# Patient Record
Sex: FEMALE | Race: WHITE | Employment: FULL TIME | URBAN - METROPOLITAN AREA
[De-identification: names, ages, dates, MRNs, and addresses within clinical notes are randomized per-mention and may not be internally consistent; named-entity substitution may affect disease eponyms.]

---

## 2017-12-07 ENCOUNTER — PRE VISIT (OUTPATIENT)
Dept: CARDIOLOGY | Facility: CLINIC | Age: 32
End: 2017-12-07

## 2017-12-07 DIAGNOSIS — R01.1 HEART MURMUR: Primary | ICD-10-CM

## 2017-12-11 ASSESSMENT — ENCOUNTER SYMPTOMS
BLOATING: 1
MUSCLE CRAMPS: 0
DISTURBANCES IN COORDINATION: 1
ARTHRALGIAS: 1
SINUS PAIN: 1
INCREASED ENERGY: 0
PANIC: 1
DYSPNEA ON EXERTION: 1
PALPITATIONS: 1
DISTURBANCES IN COORDINATION: 1
MEMORY LOSS: 0
LEG PAIN: 0
POSTURAL DYSPNEA: 1
LOSS OF CONSCIOUSNESS: 0
FATIGUE: 1
HYPOTENSION: 1
WEAKNESS: 1
NECK MASS: 0
LIGHT-HEADEDNESS: 1
PARALYSIS: 0
SPEECH CHANGE: 0
PANIC: 1
TASTE DISTURBANCE: 1
FEVER: 0
HEARTBURN: 1
BLOOD IN STOOL: 0
ABDOMINAL PAIN: 1
LEG PAIN: 0
INCREASED ENERGY: 0
HALLUCINATIONS: 0
CHILLS: 1
BLOOD IN STOOL: 0
LOSS OF CONSCIOUSNESS: 0
NUMBNESS: 0
SPUTUM PRODUCTION: 0
LIGHT-HEADEDNESS: 1
DIZZINESS: 1
BRUISES/BLEEDS EASILY: 0
SMELL DISTURBANCE: 1
VOMITING: 1
COUGH DISTURBING SLEEP: 0
JOINT SWELLING: 0
WHEEZING: 1
EXERCISE INTOLERANCE: 1
WEIGHT GAIN: 0
SHORTNESS OF BREATH: 1
NUMBNESS: 0
EXERCISE INTOLERANCE: 1
STIFFNESS: 1
INSOMNIA: 1
NECK PAIN: 1
HOARSE VOICE: 0
MUSCLE WEAKNESS: 1
WEIGHT LOSS: 0
ALTERED TEMPERATURE REGULATION: 1
DECREASED CONCENTRATION: 1
NERVOUS/ANXIOUS: 1
HOARSE VOICE: 0
COUGH: 1
SYNCOPE: 0
HYPERTENSION: 0
HEADACHES: 1
ALTERED TEMPERATURE REGULATION: 1
HYPERTENSION: 0
DIARRHEA: 0
MEMORY LOSS: 0
CHILLS: 1
INSOMNIA: 1
NECK MASS: 0
SPUTUM PRODUCTION: 0
BACK PAIN: 1
TROUBLE SWALLOWING: 0
TREMORS: 1
MUSCLE CRAMPS: 0
FATIGUE: 1
BLOATING: 1
POLYDIPSIA: 0
VOMITING: 1
SMELL DISTURBANCE: 1
JOINT SWELLING: 0
JAUNDICE: 0
NIGHT SWEATS: 0
MUSCLE WEAKNESS: 1
ABDOMINAL PAIN: 1
TREMORS: 1
NAUSEA: 1
MYALGIAS: 1
NIGHT SWEATS: 0
SLEEP DISTURBANCES DUE TO BREATHING: 1
PARALYSIS: 0
HALLUCINATIONS: 0
CONSTIPATION: 0
TROUBLE SWALLOWING: 0
SINUS CONGESTION: 1
SWOLLEN GLANDS: 0
DEPRESSION: 0
WEAKNESS: 1
ORTHOPNEA: 1
SNORES LOUDLY: 0
ARTHRALGIAS: 1
RECTAL PAIN: 0
STIFFNESS: 1
DIZZINESS: 1
COUGH: 1
SORE THROAT: 0
SEIZURES: 0
BOWEL INCONTINENCE: 0
PALPITATIONS: 1
DECREASED APPETITE: 1
MYALGIAS: 1
RECTAL PAIN: 0
SEIZURES: 0
WEIGHT LOSS: 0
NECK PAIN: 1
HEMOPTYSIS: 0
COUGH DISTURBING SLEEP: 0
TINGLING: 1
SINUS PAIN: 1
DECREASED APPETITE: 1
POLYPHAGIA: 0
HEADACHES: 1
SHORTNESS OF BREATH: 1
SLEEP DISTURBANCES DUE TO BREATHING: 1
HEARTBURN: 1
SWOLLEN GLANDS: 0
NERVOUS/ANXIOUS: 1
HYPOTENSION: 1
BOWEL INCONTINENCE: 0
SNORES LOUDLY: 0
SINUS CONGESTION: 1
TINGLING: 1
POLYDIPSIA: 0
WHEEZING: 1
FEVER: 0
TASTE DISTURBANCE: 1
SYNCOPE: 0
SORE THROAT: 0
CONSTIPATION: 0
DIARRHEA: 0
ORTHOPNEA: 1
POSTURAL DYSPNEA: 1
BRUISES/BLEEDS EASILY: 0
DECREASED CONCENTRATION: 1
DEPRESSION: 0
BACK PAIN: 1
SPEECH CHANGE: 0
HEMOPTYSIS: 0
JAUNDICE: 0
WEIGHT GAIN: 0
DYSPNEA ON EXERTION: 1
POLYPHAGIA: 0
NAUSEA: 1

## 2017-12-12 ENCOUNTER — OFFICE VISIT (OUTPATIENT)
Dept: CARDIOLOGY | Facility: CLINIC | Age: 32
End: 2017-12-12
Attending: INTERNAL MEDICINE
Payer: COMMERCIAL

## 2017-12-12 VITALS
WEIGHT: 120.6 LBS | HEART RATE: 101 BPM | HEIGHT: 63 IN | OXYGEN SATURATION: 98 % | BODY MASS INDEX: 21.37 KG/M2 | DIASTOLIC BLOOD PRESSURE: 68 MMHG | SYSTOLIC BLOOD PRESSURE: 105 MMHG

## 2017-12-12 DIAGNOSIS — R07.89 ATYPICAL CHEST PAIN: ICD-10-CM

## 2017-12-12 DIAGNOSIS — R00.2 PALPITATIONS: Primary | ICD-10-CM

## 2017-12-12 DIAGNOSIS — R01.1 HEART MURMUR: ICD-10-CM

## 2017-12-12 DIAGNOSIS — Z33.1 PREGNANT STATE, INCIDENTAL: ICD-10-CM

## 2017-12-12 DIAGNOSIS — R06.02 SOB (SHORTNESS OF BREATH): ICD-10-CM

## 2017-12-12 PROCEDURE — 99212 OFFICE O/P EST SF 10 MIN: CPT | Mod: 25,ZF

## 2017-12-12 PROCEDURE — 99204 OFFICE O/P NEW MOD 45 MIN: CPT | Mod: ZP | Performed by: INTERNAL MEDICINE

## 2017-12-12 PROCEDURE — 93010 ELECTROCARDIOGRAM REPORT: CPT | Mod: ZP | Performed by: INTERNAL MEDICINE

## 2017-12-12 PROCEDURE — 93005 ELECTROCARDIOGRAM TRACING: CPT | Mod: ZF

## 2017-12-12 RX ORDER — MULTIVITAMIN WITH IRON
TABLET ORAL
COMMUNITY
Start: 2017-12-07

## 2017-12-12 RX ORDER — ALBUTEROL SULFATE 90 UG/1
AEROSOL, METERED RESPIRATORY (INHALATION)
COMMUNITY
Start: 2017-06-05

## 2017-12-12 RX ORDER — LORAZEPAM 0.5 MG/1
TABLET ORAL PRN
COMMUNITY
Start: 2017-09-07

## 2017-12-12 RX ORDER — MAGNESIUM GLYCINATE 100 MG
TABLET ORAL
COMMUNITY

## 2017-12-12 RX ORDER — PROCHLORPERAZINE MALEATE 10 MG
TABLET ORAL
COMMUNITY
Start: 2017-12-07

## 2017-12-12 ASSESSMENT — PAIN SCALES - GENERAL: PAINLEVEL: NO PAIN (0)

## 2017-12-12 NOTE — NURSING NOTE
Chief Complaint   Patient presents with     Shortness of Breath     Murmur; EKG     Chest Pain     Vitals were taken and medications were reconciled. EKG was performed    Becky Rizzo MA  7:33 AM

## 2017-12-12 NOTE — LETTER
Date:December 13, 2017      Patient was self referred, no letter generated. Do not send.        [facility] Health Information

## 2017-12-12 NOTE — LETTER
12/12/2017      RE: Cayla Timmons  [address] [facility] [address]       Dear Colleague,    Thank you for the opportunity to participate in the care of your patient, Cayla Timmons, at the [facility]. Please see a copy of my visit note below.    I am delighted to see Cayla Timmons in consultation.The primary encounter diagnosis was Palpitations. Diagnoses of Heart murmur, SOB (shortness of breath), Atypical chest pain, and Pregnant state, incidental were also pertinent to this visit.   As you know, the patient is a 32 year old  female. She   has a past medical history of Anxiety; Endometriosis; Fibromyalgia; and Graves disease..    On this visit, the patient states that she has SOB, atypical chest pain, and palpitations since last January.  Her chest pain is constant and does not vary with exercise, breathing, or eating.  Her palpations happen several times a day without syncope. No family history of sudden death. Her SOB is at rest and worsened with exercise.  No fevers or coughing. The patient denies syncope, orthopnea, paroxysmal nocturnal dyspnea and lower extermity edema.    The patient's cardiovascular risk factors include asthma.    The following portions of the patient's history were reviewed and updated as appropriate: allergies, current medications, past family history, past medical history, past social history, past surgical history, and the problem list.    PMH: The patient's past medical history includes:    Past Medical History:   Diagnosis Date     Anxiety      Endometriosis      Fibromyalgia      Graves disease       Past Surgical History:   Procedure Laterality Date     CHOLECYSTECTOMY       CYSTOSCOPY, RETROGRADES, COMBINED  6/11/2013    Procedure: COMBINED CYSTOSCOPY, RETROGRADES;;  Surgeon: Gerson Salazar MD;  Location: [facility] OR     DAVINCI LYSIS OF ADHESIONS  6/11/2013    Procedure: DAVINCI LYSIS OF  ADHESIONS;  CYSTOSCOPY, RIGHT RETROGRADE IN ROOM 19 (DR MAYA) DAVINCI PELVISCOPY WITH EXCISION OF ENDOMETRIOSIS (Dr Zavala) ;  Surgeon: Efraín Zavala MD;  Location: [facility] OR     tonsellectomy       wisdom teeth extraction         The patient's medications as of the current encounter are:     Current Outpatient Prescriptions   Medication Sig Dispense Refill     mometasone-formoterol (DULERA) 100-5 MCG/ACT oral inhaler        Prenatal Vit-Fe Fumarate-FA (PRENATAL PO)        prochlorperazine (COMPAZINE) 10 MG tablet        pyridoxine (VITAMIN B-6) 100 MG tablet        sertraline (ZOLOFT) 50 MG tablet        DiphenhydrAMINE HCl, Sleep, (UNISOM SLEEPGELS) 50 MG CAPS        albuterol (VENTOLIN HFA) 108 (90 BASE) MCG/ACT Inhaler        Cholecalciferol (VITAMIN D-3) 5000 UNITS TABS        Magnesium Bisglycinate (MAG GLYCINATE) 100 MG TABS        Albuterol Sulfate (PROAIR RESPICLICK) 108 (90 BASE) MCG/ACT AEPB INL 2 PFS PO Q 4 H       LORazepam (ATIVAN) 0.5 MG tablet as needed       DOCOSAHEXAENOIC ACID PO as needed         Labs:     Admission on 06/11/2013, Discharged on 06/11/2013   Component Date Value Ref Range Status     HCG Qual Urine 06/11/2013 neg  neg Final     Internal QC OK 06/11/2013 Yes   Final       Allergies:    Allergies   Allergen Reactions     Dairy Digestive Difficulty breathing and Shortness Of Breath     Strawberry Hives     Contrast Dye      Severe nausea     Chicken-Derived Products (Egg)      Ragweeds Fatigue and Itching     Gluten Meal Anxiety, Fatigue and Muscle Pain (Myalgia)     Latex Rash       Family History:   Family History   Problem Relation Age of Onset     DIABETES Mother      Hypertension Father      Hyperlipidemia Father      Gout Father      Migraines Brother      Obsessive Compulsive Disorder Brother        Psychosocial history:  reports that she has never smoked. She does not have any smokeless tobacco history on file. She reports that she does not drink alcohol or  "use illicit drugs.    Review of systems: positive for palpitations, chest pain, dyspnea on exertion and exercise intolerance    In addition,   General: No change in weight, sleep. Nausea associated with pregnancy.  reduced energy.  No fever or chills  Eyes: Negative for vision changes or eye problems  ENT: No problems with ears, nose or throat.  No difficulty swallowing.  Resp: No coughing. Wheezing with exercise  GI: No heartburn, abdominal pain, diarrhea, constipation or change in bowel habits  : No urinary frequency or dysuria, bladder or kidney problems  Musculoskeletal: No significant muscle or joint pains. Generalized weakness  Neurologic: No headaches, numbness, tingling, weakness, problems with balance or coordination  Psychiatric: Anxiety  Heme/immune/allergy: No history of bleeding or clotting problems or anemia.    Endocrine:History of Graves. Recent TSH normal  Skin: No rashes,worrisome lesions or skin problems  Vascular:  No claudication, lifestyle limiting or otherwise; no ischemic rest pain; no non-healing ulcers. No weakness, No loss of sensation        Physical examination  Vitals: /68 (BP Location: Left arm, Cuff Size: Adult Regular)  Pulse 101  Ht 1.6 m (5' 3\")  Wt 54.7 kg (120 lb 9.6 oz)  SpO2 98%  BMI 21.36 kg/m2  BMI= Body mass index is 21.36 kg/(m^2).    In general, the patient is a pleasant female in no apparent distress.    HEENT: Normocephalic and atraumatic.  PERRLA.  EOMI.  Sclerae white, not injected.  No nystagmus. Nares clear.  Pharynx without erythema or exudate.  Dentition intact.    Neck: No adenopathy.  No thyromegaly. Carotids +2/2 bilaterally without bruits.  No jugular venous distension.   Heart:  The PMI is in the 5th ICS in the midclavicular line. There is no heave. Regular rate and rhythm. Normal S1, S2 splits physiologically. No murmur, rub, click, or gallop.    Lungs: Clear to asculation.  No ronchi, wheezes, rales.  No dullness to percussion.   Abdomen: Soft, " nontender. No organomegaly. No AAA.  No bruits. Uterus at pelvic symphysis  Extremities: No clubbing, cyanosis, or edema. The pulses were intact bilaterally.   Neurological: The neurological examination reveal a patient who was oriented to person, place, and time.  The remainder of the examination was nonfocal.  5/5 strength upper=lower, left=right, prox=distal    Cardiac tests include:    ECG: normal sinus with normal intervals axis and waveforms    Assessment and Plan    1. SOB - O2 sat normal, Hb normal  - will rule out lung disease with PFTs  - check echo  2. Chest pain - recent ED visit with normal troponin  - plan for exercise echo stress test.  3. Palpitations - plan for event recorder  - risk stratification with echo  4. Pregnancy - expectant management    The patient is to return  4 weeks. The patient understood the treatment plan as outlined above.  There were no barriers to learning.      Magdaleno Winston MD       Please do not hesitate to contact me if you have any questions/concerns.     Sincerely,     Magdaleno Winston MD

## 2017-12-12 NOTE — PROGRESS NOTES
I am delighted to see Cayla Timmons in consultation.The primary encounter diagnosis was Palpitations. Diagnoses of Heart murmur, SOB (shortness of breath), Atypical chest pain, and Pregnant state, incidental were also pertinent to this visit.   As you know, the patient is a 32 year old  female. She   has a past medical history of Anxiety; Endometriosis; Fibromyalgia; and Graves disease..    On this visit, the patient states that she has SOB, atypical chest pain, and palpitations since last January.  Her chest pain is constant and does not vary with exercise, breathing, or eating.  Her palpations happen several times a day without syncope. No family history of sudden death. Her SOB is at rest and worsened with exercise.  No fevers or coughing. The patient denies syncope, orthopnea, paroxysmal nocturnal dyspnea and lower extermity edema.    The patient's cardiovascular risk factors include asthma.    The following portions of the patient's history were reviewed and updated as appropriate: allergies, current medications, past family history, past medical history, past social history, past surgical history, and the problem list.    PMH: The patient's past medical history includes:    Past Medical History:   Diagnosis Date     Anxiety      Endometriosis      Fibromyalgia      Graves disease       Past Surgical History:   Procedure Laterality Date     CHOLECYSTECTOMY       CYSTOSCOPY, RETROGRADES, COMBINED  6/11/2013    Procedure: COMBINED CYSTOSCOPY, RETROGRADES;;  Surgeon: Gerson Maya MD;  Location:  OR     DAVINCI LYSIS OF ADHESIONS  6/11/2013    Procedure: DAVINCI LYSIS OF ADHESIONS;  CYSTOSCOPY, RIGHT RETROGRADE IN ROOM 19 (DR MAYA) DAVINCI PELVISCOPY WITH EXCISION OF ENDOMETRIOSIS (Dr Zavala) ;  Surgeon: Efraín Zavala MD;  Location:  OR     tonsellectomy       wisdom teeth extraction         The patient's medications as of the current encounter are:     Current  Outpatient Prescriptions   Medication Sig Dispense Refill     mometasone-formoterol (DULERA) 100-5 MCG/ACT oral inhaler        Prenatal Vit-Fe Fumarate-FA (PRENATAL PO)        prochlorperazine (COMPAZINE) 10 MG tablet        pyridoxine (VITAMIN B-6) 100 MG tablet        sertraline (ZOLOFT) 50 MG tablet        DiphenhydrAMINE HCl, Sleep, (UNISOM SLEEPGELS) 50 MG CAPS        albuterol (VENTOLIN HFA) 108 (90 BASE) MCG/ACT Inhaler        Cholecalciferol (VITAMIN D-3) 5000 UNITS TABS        Magnesium Bisglycinate (MAG GLYCINATE) 100 MG TABS        Albuterol Sulfate (PROAIR RESPICLICK) 108 (90 BASE) MCG/ACT AEPB INL 2 PFS PO Q 4 H       LORazepam (ATIVAN) 0.5 MG tablet as needed       DOCOSAHEXAENOIC ACID PO as needed         Labs:     Admission on 06/11/2013, Discharged on 06/11/2013   Component Date Value Ref Range Status     HCG Qual Urine 06/11/2013 neg  neg Final     Internal QC OK 06/11/2013 Yes   Final       Allergies:    Allergies   Allergen Reactions     Dairy Digestive Difficulty breathing and Shortness Of Breath     Strawberry Hives     Contrast Dye      Severe nausea     Chicken-Derived Products (Egg)      Ragweeds Fatigue and Itching     Gluten Meal Anxiety, Fatigue and Muscle Pain (Myalgia)     Latex Rash       Family History:   Family History   Problem Relation Age of Onset     DIABETES Mother      Hypertension Father      Hyperlipidemia Father      Gout Father      Migraines Brother      Obsessive Compulsive Disorder Brother        Psychosocial history:  reports that she has never smoked. She does not have any smokeless tobacco history on file. She reports that she does not drink alcohol or use illicit drugs.    Review of systems: positive for palpitations, chest pain, dyspnea on exertion and exercise intolerance    In addition,   General: No change in weight, sleep. Nausea associated with pregnancy.  reduced energy.  No fever or chills  Eyes: Negative for vision changes or eye problems  ENT: No problems  "with ears, nose or throat.  No difficulty swallowing.  Resp: No coughing. Wheezing with exercise  GI: No heartburn, abdominal pain, diarrhea, constipation or change in bowel habits  : No urinary frequency or dysuria, bladder or kidney problems  Musculoskeletal: No significant muscle or joint pains. Generalized weakness  Neurologic: No headaches, numbness, tingling, weakness, problems with balance or coordination  Psychiatric: Anxiety  Heme/immune/allergy: No history of bleeding or clotting problems or anemia.    Endocrine:History of Graves. Recent TSH normal  Skin: No rashes,worrisome lesions or skin problems  Vascular:  No claudication, lifestyle limiting or otherwise; no ischemic rest pain; no non-healing ulcers. No weakness, No loss of sensation        Physical examination  Vitals: /68 (BP Location: Left arm, Cuff Size: Adult Regular)  Pulse 101  Ht 1.6 m (5' 3\")  Wt 54.7 kg (120 lb 9.6 oz)  SpO2 98%  BMI 21.36 kg/m2  BMI= Body mass index is 21.36 kg/(m^2).    In general, the patient is a pleasant female in no apparent distress.    HEENT: Normocephalic and atraumatic.  PERRLA.  EOMI.  Sclerae white, not injected.  No nystagmus. Nares clear.  Pharynx without erythema or exudate.  Dentition intact.    Neck: No adenopathy.  No thyromegaly. Carotids +2/2 bilaterally without bruits.  No jugular venous distension.   Heart:  The PMI is in the 5th ICS in the midclavicular line. There is no heave. Regular rate and rhythm. Normal S1, S2 splits physiologically. No murmur, rub, click, or gallop.    Lungs: Clear to asculation.  No ronchi, wheezes, rales.  No dullness to percussion.   Abdomen: Soft, nontender. No organomegaly. No AAA.  No bruits. Uterus at pelvic symphysis  Extremities: No clubbing, cyanosis, or edema. The pulses were intact bilaterally.   Neurological: The neurological examination reveal a patient who was oriented to person, place, and time.  The remainder of the examination was nonfocal.  5/5 " strength upper=lower, left=right, prox=distal    Cardiac tests include:    ECG: normal sinus with normal intervals axis and waveforms    Assessment and Plan    1. SOB - O2 sat normal, Hb normal  - will rule out lung disease with PFTs  - check echo  2. Chest pain - recent ED visit with normal troponin  - plan for exercise echo stress test.  3. Palpitations - plan for event recorder  - risk stratification with echo  4. Pregnancy - expectant management    The patient is to return  4 weeks. The patient understood the treatment plan as outlined above.  There were no barriers to learning.      Magdaleno Winston MD

## 2017-12-13 LAB — INTERPRETATION ECG - MUSE: NORMAL

## 2017-12-16 ENCOUNTER — HEALTH MAINTENANCE LETTER (OUTPATIENT)
Age: 32
End: 2017-12-16

## 2017-12-18 ENCOUNTER — CARE COORDINATION (OUTPATIENT)
Dept: CARDIOLOGY | Facility: CLINIC | Age: 32
End: 2017-12-18

## 2017-12-18 ENCOUNTER — RADIANT APPOINTMENT (OUTPATIENT)
Dept: CARDIOLOGY | Facility: CLINIC | Age: 32
End: 2017-12-18
Payer: COMMERCIAL

## 2017-12-18 ENCOUNTER — APPOINTMENT (OUTPATIENT)
Dept: LAB | Facility: CLINIC | Age: 32
End: 2017-12-18
Payer: COMMERCIAL

## 2017-12-18 ENCOUNTER — ALLIED HEALTH/NURSE VISIT (OUTPATIENT)
Dept: CARDIOLOGY | Facility: CLINIC | Age: 32
End: 2017-12-18
Attending: INTERNAL MEDICINE
Payer: COMMERCIAL

## 2017-12-18 ENCOUNTER — ANCILLARY ORDERS (OUTPATIENT)
Dept: CARDIOLOGY | Facility: CLINIC | Age: 32
End: 2017-12-18
Payer: COMMERCIAL

## 2017-12-18 DIAGNOSIS — R06.02 SOB (SHORTNESS OF BREATH): ICD-10-CM

## 2017-12-18 DIAGNOSIS — R07.89 ATYPICAL CHEST PAIN: ICD-10-CM

## 2017-12-18 DIAGNOSIS — R00.2 PALPITATIONS: ICD-10-CM

## 2017-12-18 DIAGNOSIS — R00.2 PALPITATIONS: Primary | ICD-10-CM

## 2017-12-18 LAB
DLCOUNC-%PRED-PRE: 101 %
DLCOUNC-PRE: 24.86 ML/MIN/MMHG
DLCOUNC-PRED: 24.4 ML/MIN/MMHG
ERV-%PRED-PRE: 64 %
ERV-PRE: 0.85 L
ERV-PRED: 1.33 L
EXPTIME-PRE: 3.42 SEC
FEF2575-%PRED-POST: 80 %
FEF2575-%PRED-PRE: 65 %
FEF2575-POST: 2.77 L/SEC
FEF2575-PRE: 2.28 L/SEC
FEF2575-PRED: 3.45 L/SEC
FEFMAX-%PRED-PRE: 70 %
FEFMAX-PRE: 4.83 L/SEC
FEFMAX-PRED: 6.85 L/SEC
FEV1-%PRED-PRE: 75 %
FEV1-PRE: 2.35 L
FEV1FEV6-PRE: 84 %
FEV1FEV6-PRED: 85 %
FEV1FVC-PRE: 84 %
FEV1FVC-PRED: 85 %
FEV1SVC-PRE: 85 %
FEV1SVC-PRED: 82 %
FIFMAX-PRE: 3.01 L/SEC
FRCPLETH-%PRED-PRE: 96 %
FRCPLETH-PRE: 2.54 L
FRCPLETH-PRED: 2.62 L
FVC-%PRED-PRE: 76 %
FVC-PRE: 2.79 L
FVC-PRED: 3.66 L
IC-%PRED-PRE: 78 %
IC-PRE: 1.9 L
IC-PRED: 2.42 L
RVPLETH-%PRED-PRE: 120 %
RVPLETH-PRE: 1.68 L
RVPLETH-PRED: 1.4 L
TLCPLETH-%PRED-PRE: 93 %
TLCPLETH-PRE: 4.44 L
TLCPLETH-PRED: 4.77 L
VA-%PRED-PRE: 90 %
VA-PRE: 4.44 L
VC-%PRED-PRE: 73 %
VC-PRE: 2.76 L
VC-PRED: 3.75 L

## 2017-12-18 PROCEDURE — 0298T ZZC EXT ECG > 48HR TO 21 DAY REVIEW AND INTERPRETATN: CPT | Performed by: INTERNAL MEDICINE

## 2017-12-18 PROCEDURE — 0296T ZIO PATCH HOLTER: CPT | Mod: ZF

## 2017-12-18 NOTE — NURSING NOTE
Per Magdaleno Espitia, patient to have 7 days Zio monitor placed.  Diagnosis: Palpitation  Monitor placed: Yes  Patient Instructed: Yes  Patient verbalized understanding: Yes  Holter # [id number]  Placed by Angela Law CMA. 10:10 AM

## 2017-12-21 ENCOUNTER — ONCOLOGY VISIT (OUTPATIENT)
Dept: ONCOLOGY | Facility: CLINIC | Age: 32
End: 2017-12-21
Attending: INTERNAL MEDICINE
Payer: COMMERCIAL

## 2017-12-21 VITALS
SYSTOLIC BLOOD PRESSURE: 97 MMHG | BODY MASS INDEX: 21.2 KG/M2 | DIASTOLIC BLOOD PRESSURE: 56 MMHG | HEART RATE: 72 BPM | TEMPERATURE: 98.7 F | RESPIRATION RATE: 16 BRPM | WEIGHT: 119.7 LBS | OXYGEN SATURATION: 98 %

## 2017-12-21 DIAGNOSIS — R06.02 SOB (SHORTNESS OF BREATH): Primary | ICD-10-CM

## 2017-12-21 DIAGNOSIS — Z3A.08 8 WEEKS GESTATION OF PREGNANCY: ICD-10-CM

## 2017-12-21 DIAGNOSIS — R00.2 PALPITATIONS: ICD-10-CM

## 2017-12-21 PROCEDURE — 99212 OFFICE O/P EST SF 10 MIN: CPT | Mod: ZF

## 2017-12-21 PROCEDURE — 99204 OFFICE O/P NEW MOD 45 MIN: CPT | Mod: ZP | Performed by: INTERNAL MEDICINE

## 2017-12-21 ASSESSMENT — PAIN SCALES - GENERAL: PAINLEVEL: NO PAIN (0)

## 2017-12-21 NOTE — NURSING NOTE
"Oncology Rooming Note    December 21, 2017 4:09 PM   Cayla Timmons is a 32 year old female who presents for:    Chief Complaint   Patient presents with     Oncology Clinic Visit     Anemia      Initial Vitals: BP 97/56  Pulse 72  Temp 98.7  F (37.1  C) (Tympanic)  Resp 16  Wt 54.3 kg (119 lb 11.2 oz)  SpO2 98%  BMI 21.2 kg/m2 Estimated body mass index is 21.2 kg/(m^2) as calculated from the following:    Height as of 12/12/17: 1.6 m (5' 3\").    Weight as of this encounter: 54.3 kg (119 lb 11.2 oz). Body surface area is 1.55 meters squared.  No Pain (0) Comment: Data Unavailable   No LMP recorded.  Allergies reviewed: Yes  Medications reviewed: Yes    Medications: Medication refills not needed today.  Pharmacy name entered into [employer]: [facility] [address] [place] [address] [facility] AT [facility]    Clinical concerns: No concerns  provider was NOT notified.    7 minutes for nursing intake (face to face time)     Hollis [place]              "

## 2017-12-21 NOTE — MR AVS SNAPSHOT
After Visit Summary   12/21/2017    Cayla Timmons    MRN: [id number]           Patient Information     Date Of Birth          1985        Visit Information        Provider Department      12/21/2017 4:00 PM Sonido Pemberton MD [facility]        Today's Diagnoses     SOB (shortness of breath)    -  1    Palpitations        8 weeks gestation of pregnancy           Follow-ups after your visit        Your next 10 appointments already scheduled     Jan 23, 2018  7:00 AM CST   (Arrive by 6:45 AM)   Return Visit with Magdaleno Winston MD   [facility] ([facility])    [address]  [address] [facility] [address] [phone]              Who to contact     If you have questions or need follow up information about today's clinic visit or your schedule please contact [facility] directly at [phone].  Normal or non-critical lab and imaging results will be communicated to you by MyChart, letter or phone within 4 business days after the clinic has received the results. If you do not hear from us within 7 days, please contact the clinic through [employer] or phone. If you have a critical or abnormal lab result, we will notify you by phone as soon as possible.  Submit refill requests through [employer] or call your pharmacy and they will forward the refill request to us. Please allow 3 business days for your refill to be completed.          Additional Information About Your Visit        MyChart Information     [employer] gives you secure access to your electronic health record. If you see a primary care provider, you can also send messages to your care team and make appointments. If you have questions, please call your primary care clinic.  If you do not have a primary care provider, please call [phone] and they will assist you.        Care EveryWhere ID     This is your Care EveryWhere ID. This could be used by  other organizations to access your [facility] medical records  [id number]        Your Vitals Were     Pulse Temperature Respirations Pulse Oximetry BMI (Body Mass Index)       72 98.7  F (37.1  C) (Tympanic) 16 98% 21.2 kg/m2        Blood Pressure from Last 3 Encounters:   12/21/17 97/56   12/12/17 105/68   06/11/13 98/68    Weight from Last 3 Encounters:   12/21/17 54.3 kg (119 lb 11.2 oz)   12/12/17 54.7 kg (120 lb 9.6 oz)   06/11/13 63.7 kg (140 lb 8 oz)              Today, you had the following     No orders found for display       Primary Care Provider Fax #    Physician No Ref-Primary [phone]       No address on file        Equal Access to Services     DAVID TORRES : Lyudmila Cleis, wanila joshiqadaha, qajanina kaalmadania simon, maria guadalupe simon . So [facility] [phone].    ATENCIÓN: Si habla español, tiene a jha disposición servicios gratuitos de asistencia lingüística. Llame al [phone].    We comply with applicable federal civil rights laws and [place] laws. We do not discriminate on the basis of race, color, national origin, age, disability, sex, sexual orientation, or gender identity.            Thank you!     Thank you for choosing [facility]  for your care. Our goal is always to provide you with excellent care. Hearing back from our patients is one way we can continue to improve our services. Please take a few minutes to complete the written survey that you may receive in the mail after your visit with us. Thank you!             Your Updated Medication List - Protect others around you: Learn how to safely use, store and throw away your medicines at www.disposemymeds.org.          This list is accurate as of: 12/21/17 11:59 PM.  Always use your most recent med list.                   Brand Name Dispense Instructions for use Diagnosis    DOCOSAHEXAENOIC ACID PO      as needed        DULERA 100-5 MCG/ACT oral inhaler   Generic drug:   mometasone-formoterol           LORazepam 0.5 MG tablet    ATIVAN     as needed        Mag Glycinate 100 MG Tabs           PRENATAL PO           prochlorperazine 10 MG tablet    COMPAZINE          pyridoxine 100 MG tablet    VITAMIN B-6          sertraline 50 MG tablet    ZOLOFT          UNISOM SLEEPGELS 50 MG Caps   Generic drug:  DiphenhydrAMINE HCl (Sleep)           * VENTOLIN  (90 BASE) MCG/ACT Inhaler   Generic drug:  albuterol           * PROAIR RESPICLICK 108 (90 BASE) MCG/ACT Aepb   Generic drug:  Albuterol Sulfate      INL 2 PFS PO Q 4 H        Vitamin D-3 5000 UNITS Tabs           * Notice:  This list has 2 medication(s) that are the same as other medications prescribed for you. Read the directions carefully, and ask your doctor or other care provider to review them with you.

## 2017-12-21 NOTE — LETTER
12/21/2017       RE: Cayla Timmons  [address]  [facility] [address]     Dear Colleague,    Thank you for referring your patient, Cayla Timmons, to the [facility]. Please see a copy of my visit note below.      HEMATOLOGY CLINIC VISIT    Cayla Timmons is a 32-year-old female self-referred for evaluation of anemia.  She is currently 8 weeks pregnant.  This is her first pregnancy.  She has a several-month history of dizziness, shortness of breath and palpitations.  These symptoms have been evaluated by other providers, including a recent evaluation in our system by Cardiology.  A specific cause for them has not been identified.  She reports a past history of anemia, and brings with her today a variety of lab results obtained over the last year or two.  She has many questions about some of the values on the CBCs obtained over that timeframe.  Her main concern is whether or not her symptoms are related to anemia.  She receives her primary care and OB care outside of our system.     PAST MEDICAL HISTORY:  Includes diagnoses of anxiety, endometriosis, fibromyalgia and Graves' disease.        PAST SURGICAL HISTORY:  Includes a cholecystectomy and laparoscopic lysis of adhesions for endometriosis.  She also has a history of tonsillectomy and wisdom teeth extraction.       CURRENT MEDICATIONS:  Were reviewed in the EMR.  She is taking a prenatal vitamin with iron.       PHYSICAL EXAMINATION:  She appears to be in good overall health.  She is not pale or jaundiced.  There is no scleral icterus.  A more detailed exam was not performed today.      LABORATORY DATA:  We obtained no new labs as part of today's visit.  We did review the labs that she brought with her in detail.  In summary, she has 6 CBCs that have been obtained during the period of 08/2016 through 12/2017, with the most recent CBC being obtained just 1 week ago.  In general, all the CBC parameters are normal.  Her white  count has been consistently in the normal range.  Her hemoglobin has fluctuated between 12.1 and 13.4.  Her platelet count has been consistently normal.  She did have a single CBC from 11/2017, which had an unexpectedly elevated MCV of 109.6.  This is the one CBC obtained in an outside lab.  The rest of the CBC parameters on that specimen appear to be within her baseline.  A CBC obtained 3 weeks following that, as well as 2 subsequent CBCs all showed consistently normal MCVs.  Thus, I suspect this single isolated elevated MCV reading is erroneous.  She was concerned about why her RBC count seemed to be trending down, and why her hemoglobin seemed to be trending down.  She also had questions about why her RDW was occasionally outside the normal range.  Please see further discussion below.      Additional labs obtained in early 12/2017 at an outside facility showed a normal iron panel with a ferritin of 115 and vitamin B12 level normal at 871.        ASSESSMENT AND PLAN:    Concern about anemia.      I spent a total of 45 minutes today with Cayla, all of which was in counseling and coordination of care.  She has had a several-month history of symptoms including dizziness, shortness of breath, and palpitations that she was concerned may be related to anemia.  She reports a past history of anemia, although we do not have any documentation of that.  She is currently 8 weeks pregnant.      We reviewed her CBCs as outlined above.  There are no significant abnormalities.  There are some mild fluctuations in some of the CBC parameters, and there are occasional values, which fall just outside the normal ranges.  I reassured her that these are normal fluctuations in these parameters, and that there are no concerning numbers or trends and nothing that suggests any underlying hematologic problem.  She is not anemic.      I did explain to her that her hemoglobin will be expected to go down during pregnancy.  I reassured her that  her OB/GYN physician will be monitoring that as part of the routine pregnancy care.  At this time, I do not see any indication of an underlying hematologic disorder as a cause for her symptoms.  I encouraged her to continue following up with her primary physician in that regard.  A return appointment here is not necessary.        At the end of our discussion she seemed reassured, and all of her questions have been answered to her apparent satisfaction.       Sonido Pemberton MD  Associate Professor of Medicine  Division of Hematology, Oncology, and Transplantation  Director, [facility]              Again, thank you for allowing me to participate in the care of your patient.      Sincerely,    Sonido Pemberton MD

## 2017-12-21 NOTE — LETTER
Date:January 2, 2018      Patient was self referred, no letter generated. Do not send.        [facility] Information

## 2017-12-22 NOTE — PROGRESS NOTES
HEMATOLOGY CLINIC VISIT    Cayla Timmons is a 32-year-old female self-referred for evaluation of anemia.  She is currently 8 weeks pregnant.  This is her first pregnancy.  She has a several-month history of dizziness, shortness of breath and palpitations.  These symptoms have been evaluated by other providers, including a recent evaluation in our system by Cardiology.  A specific cause for them has not been identified.  She reports a past history of anemia, and brings with her today a variety of lab results obtained over the last year or two.  She has many questions about some of the values on the CBCs obtained over that timeframe.  Her main concern is whether or not her symptoms are related to anemia.  She receives her primary care and OB care outside of our system.     PAST MEDICAL HISTORY:  Includes diagnoses of anxiety, endometriosis, fibromyalgia and Graves' disease.        PAST SURGICAL HISTORY:  Includes a cholecystectomy and laparoscopic lysis of adhesions for endometriosis.  She also has a history of tonsillectomy and wisdom teeth extraction.       CURRENT MEDICATIONS:  Were reviewed in the EMR.  She is taking a prenatal vitamin with iron.       PHYSICAL EXAMINATION:  She appears to be in good overall health.  She is not pale or jaundiced.  There is no scleral icterus.  A more detailed exam was not performed today.      LABORATORY DATA:  We obtained no new labs as part of today's visit.  We did review the labs that she brought with her in detail.  In summary, she has 6 CBCs that have been obtained during the period of 08/2016 through 12/2017, with the most recent CBC being obtained just 1 week ago.  In general, all the CBC parameters are normal.  Her white count has been consistently in the normal range.  Her hemoglobin has fluctuated between 12.1 and 13.4.  Her platelet count has been consistently normal.  She did have a single CBC from 11/2017, which had an unexpectedly elevated MCV of 109.6.  This is  the one CBC obtained in an outside lab.  The rest of the CBC parameters on that specimen appear to be within her baseline.  A CBC obtained 3 weeks following that, as well as 2 subsequent CBCs all showed consistently normal MCVs.  Thus, I suspect this single isolated elevated MCV reading is erroneous.  She was concerned about why her RBC count seemed to be trending down, and why her hemoglobin seemed to be trending down.  She also had questions about why her RDW was occasionally outside the normal range.  Please see further discussion below.      Additional labs obtained in early 12/2017 at an outside facility showed a normal iron panel with a ferritin of 115 and vitamin B12 level normal at 871.        ASSESSMENT AND PLAN:    Concern about anemia.      I spent a total of 45 minutes today with Cayla, all of which was in counseling and coordination of care.  She has had a several-month history of symptoms including dizziness, shortness of breath, and palpitations that she was concerned may be related to anemia.  She reports a past history of anemia, although we do not have any documentation of that.  She is currently 8 weeks pregnant.      We reviewed her CBCs as outlined above.  There are no significant abnormalities.  There are some mild fluctuations in some of the CBC parameters, and there are occasional values, which fall just outside the normal ranges.  I reassured her that these are normal fluctuations in these parameters, and that there are no concerning numbers or trends and nothing that suggests any underlying hematologic problem.  She is not anemic.      I did explain to her that her hemoglobin will be expected to go down during pregnancy.  I reassured her that her OB/GYN physician will be monitoring that as part of the routine pregnancy care.  At this time, I do not see any indication of an underlying hematologic disorder as a cause for her symptoms.  I encouraged her to continue following up with her primary  physician in that regard.  A return appointment here is not necessary.        At the end of our discussion she seemed reassured, and all of her questions have been answered to her apparent satisfaction.       Sonido Pemberton MD  Associate Professor of Medicine  Division of Hematology, Oncology, and Transplantation  Director, [facility]

## 2018-01-04 ENCOUNTER — TRANSFERRED RECORDS (OUTPATIENT)
Dept: HEALTH INFORMATION MANAGEMENT | Facility: CLINIC | Age: 33
End: 2018-01-04

## 2018-01-16 DIAGNOSIS — R06.02 SOB (SHORTNESS OF BREATH): ICD-10-CM

## 2018-01-16 ASSESSMENT — ENCOUNTER SYMPTOMS
BLOATING: 1
WHEEZING: 0
NAUSEA: 1
CONSTIPATION: 0
SLEEP DISTURBANCES DUE TO BREATHING: 1
HEARTBURN: 1
VOMITING: 0
HEMOPTYSIS: 0
BOWEL INCONTINENCE: 0
ABDOMINAL PAIN: 1
LIGHT-HEADEDNESS: 0
HYPERTENSION: 0
SHORTNESS OF BREATH: 1
JAUNDICE: 0
EXERCISE INTOLERANCE: 0
BLOOD IN STOOL: 0
HYPOTENSION: 0
DYSPNEA ON EXERTION: 0
DIARRHEA: 0
LEG PAIN: 0
POSTURAL DYSPNEA: 0
ORTHOPNEA: 0
COUGH: 0
SNORES LOUDLY: 0
SYNCOPE: 0
PALPITATIONS: 1
RECTAL PAIN: 0
SPUTUM PRODUCTION: 0
COUGH DISTURBING SLEEP: 0

## 2018-01-19 ENCOUNTER — PRE VISIT (OUTPATIENT)
Dept: CARDIOLOGY | Facility: CLINIC | Age: 33
End: 2018-01-19

## 2018-01-19 NOTE — TELEPHONE ENCOUNTER
Echo- 12/18/17  Interpretation Summary  Global and regional left ventricular function is normal with an EF of 60-65%.  Right ventricular function, chamber size, wall motion, and thickness are  normal.  Both atria appear normal.  No pericardial effusion is present.    [place]- 12/18/17        Exercise Stress Echo- 12/18/17  Interpretation Summary  Conclusions: Exercise stress echocardiogram test inconclusive for ischemia due  to inability to achieve target heart rate (78% MPHR).     Patient experienced no chest pain during exercise.  Test terminated due to fatigue.  Exercise capacity is average for age.  Normal HR response to exercise.  Blunted BP response to exercise.  EKG at baseline and with exercise demonstrated no ischemic changes.  Normal LV function at rest. LVEF 55%. No regional wall motion abnormalities.  At peak stress, adequate LV augmentation. LVEF 65%. No regional wall motion  abnormalities.  Baseline 2D screening echocardiogram reported separately. See additional  report.     Consider additional imaging modality if high suspicion for coronary artery  disease. Clinical correlation required.    PFT- 12/18/17    The FEV1 and FVC are reduced but the FEV1/FVC ratio is normal.  The inspiratory flow rates are reduced.  Lung volumes are within normal limits.  Following administration of bronchodilators, there is no significant response.  The diffusing capacity is   normal.  However, the diffusing capacity was not corrected for the patient's hemoglobin.

## 2018-01-23 ENCOUNTER — OFFICE VISIT (OUTPATIENT)
Dept: CARDIOLOGY | Facility: CLINIC | Age: 33
End: 2018-01-23
Attending: INTERNAL MEDICINE
Payer: COMMERCIAL

## 2018-01-23 VITALS
OXYGEN SATURATION: 97 % | HEIGHT: 63 IN | HEART RATE: 88 BPM | DIASTOLIC BLOOD PRESSURE: 68 MMHG | WEIGHT: 123 LBS | SYSTOLIC BLOOD PRESSURE: 95 MMHG | BODY MASS INDEX: 21.79 KG/M2

## 2018-01-23 DIAGNOSIS — R00.2 PALPITATIONS: ICD-10-CM

## 2018-01-23 DIAGNOSIS — R07.89 ATYPICAL CHEST PAIN: Primary | ICD-10-CM

## 2018-01-23 DIAGNOSIS — R06.02 SOB (SHORTNESS OF BREATH): ICD-10-CM

## 2018-01-23 PROCEDURE — 99213 OFFICE O/P EST LOW 20 MIN: CPT | Mod: GC | Performed by: INTERNAL MEDICINE

## 2018-01-23 PROCEDURE — G0463 HOSPITAL OUTPT CLINIC VISIT: HCPCS | Mod: ZF

## 2018-01-23 ASSESSMENT — PAIN SCALES - GENERAL: PAINLEVEL: NO PAIN (0)

## 2018-01-23 NOTE — PATIENT INSTRUCTIONS
"You were seen today in the Cardiovascular Clinic at the [facility].     Cardiology Providers you saw during your visit: Dr. Edy Winston       Follow-up: as needed         Please feel free to call me with any questions or concerns.       Lee Aragon LPN     Questions and [phone].   First press #1 for the [employer] and then press #3 for \"Medical Questions\" to reach us Cardiology Nurses.      On Call Cardiologist for after hours or on weekends: [phone]   option #4 and ask to speak to the on-call Cardiologist.          If you need a medication refill please contact your pharmacy.  Please allow 3 business days for your refill to be completed.    "

## 2018-01-23 NOTE — NURSING NOTE
Chief Complaint   Patient presents with     Follow Up For     33 yo female h/o Anxiety; Endometriosis; Fibromyalgia; and Graves disease presents for 1 month follow up with SOB, palpatations, chest pain     Vitals were taken and medications were reconciled.     Becky Rizzo MA  7:13 AM

## 2018-01-23 NOTE — PROGRESS NOTES
CARDIOLOGY CLINIC    HPI:  Ms. Cayla Timmons returns to clinic today for follow up on her atypical chest discomfort, shortness of breath, palpitations and lightheadedness.     We saw Ms. Timmons in clinic in December for the above symptoms. Multiple tests were ordered which we reviewed during this visit. She reports no change in her symptoms since the last visit. She continues to have shortness of breath and chest tightness at rest that worsens with activity. She only feels better when she is sleeping. No syncope, orthopnea, PND, LE edema. No fasciculations.     PAST MEDICAL HISTORY:  Past Medical History:   Diagnosis Date     Anxiety      Endometriosis      Fibromyalgia      Graves disease        CURRENT MEDICATIONS:  Current Outpatient Prescriptions   Medication Sig Dispense Refill     mometasone-formoterol (DULERA) 100-5 MCG/ACT oral inhaler        Prenatal Vit-Fe Fumarate-FA (PRENATAL PO)        prochlorperazine (COMPAZINE) 10 MG tablet        DiphenhydrAMINE HCl, Sleep, (UNISOM SLEEPGELS) 50 MG CAPS        albuterol (VENTOLIN HFA) 108 (90 BASE) MCG/ACT Inhaler        Cholecalciferol (VITAMIN D-3) 5000 UNITS TABS        Magnesium Bisglycinate (MAG GLYCINATE) 100 MG TABS        pyridoxine (VITAMIN B-6) 100 MG tablet        sertraline (ZOLOFT) 50 MG tablet        Albuterol Sulfate (PROAIR RESPICLICK) 108 (90 BASE) MCG/ACT AEPB INL 2 PFS PO Q 4 H       LORazepam (ATIVAN) 0.5 MG tablet as needed       DOCOSAHEXAENOIC ACID PO as needed         PAST SURGICAL HISTORY:  Past Surgical History:   Procedure Laterality Date     CHOLECYSTECTOMY       CYSTOSCOPY, RETROGRADES, COMBINED  6/11/2013    Procedure: COMBINED CYSTOSCOPY, RETROGRADES;;  Surgeon: Gerson Maya MD;  Location: [facility] OR     DAVINCI LYSIS OF ADHESIONS  6/11/2013    Procedure: DAVINCI LYSIS OF ADHESIONS;  CYSTOSCOPY, RIGHT RETROGRADE IN ROOM 19 (DR MAYA) DAVINCI PELVISCOPY WITH EXCISION OF ENDOMETRIOSIS (Dr Zavala) ;  Surgeon: Efraín Zavala  "MD Javed;  Location: [facility] OR     tonsellectomy       wisdom teeth extraction         ALLERGIES  Dairy digestive; Strawberry; Contrast dye; Chicken-derived products (egg); Ragweeds; Gluten meal; and Latex    FAMILY HX:  Family History   Problem Relation Age of Onset     DIABETES Mother      Hypertension Father      Hyperlipidemia Father      Gout Father      Migraines Brother      Obsessive Compulsive Disorder Brother        SOCIAL HX:  Social History     Social History     Marital status: Single     Spouse name: N/A     Number of children: N/A     Years of education: N/A     Social History Main Topics     Smoking status: Never Smoker     Smokeless tobacco: None     Alcohol use No     Drug use: No     Sexual activity: No     Other Topics Concern     None     Social History Narrative     ROS:  Complete 10-point ROS is negative except as noted above    VITAL SIGNS:  BP 95/68  Pulse 88  Ht 1.6 m (5' 3\")  Wt 55.8 kg (123 lb)  SpO2 97%  BMI 21.79 kg/m2  Body mass index is 21.79 kg/(m^2).  Wt Readings from Last 2 Encounters:   01/23/18 55.8 kg (123 lb)   12/21/17 54.3 kg (119 lb 11.2 oz)       PHYSICAL EXAM  Gen: pleasant female sitting comfortably in NAD  Head: nc/at  Eyes: EOMI, PERRL  ENT: no OP lesions or erythema  Neck: supple, no LAD  CV: nml s1/s2, no murmur, no JVD  Chest: clear lungs  Abd: soft, NT, NABS  Ext: warm; no LE edema  Skin: no rash on limited exam  Neuro: alert, oriented; nml speech and gait; no cogwheeling    LABS  Reviewed in care everywhere    ECHO:   12/18/17 Exercise stress echo  Conclusions: Exercise stress echocardiogram test inconclusive for ischemia due  to inability to achieve target heart rate (78% MPHR).     Patient experienced no chest pain during exercise.  Test terminated due to fatigue.  Exercise capacity is average for age.  Normal HR response to exercise.  Blunted BP response to exercise.  EKG at baseline and with exercise demonstrated no ischemic changes.  Normal LV function at " rest. LVEF 55%. No regional wall motion abnormalities.  At peak stress, adequate LV augmentation. LVEF 65%. No regional wall motion  abnormalities.    12/18/17 TTE  Global and regional left ventricular function is normal with an EF of 60-65%.  Right ventricular function, chamber size, wall motion, and thickness are  normal.  Both atria appear normal.  No pericardial effusion is present.    Cardiac Monitor:    12/18/17 7 day ziopatch:  No atrial or ventricular ectopy.  5 triggered events corresponding with sinus rhythm    ASSESSMENT AND PLAN  Ms. Timmons is a very pleasant 31 yo female who presents to clinic for follow up. Her symptoms are unchanged but her cardiac testing is normal. We reviewed all the results together and reassured her that her symptoms are unlikely to be cardiac in nature. Her ziopatch showed tachycardia in the mornings that improved throughout the morning. However she does not report any change in the severity of her symptoms from morning to afternoon so even this observation is unlikely to be related to her symptoms. It may indicate some level of dehydration and we encouraged her to stay well hydrated, especially in the setting of her pregnancy.     Her PFTs were also completed and showed mildly reduced FVC and FEV1 with a normal ratio. This may be effort related or may be due to some pulmonary obstruction. She can consider following up with a pulmonologist.     Cayla has a history of borderline thyroid function tests and is followed closely by an endocrinologist. A metabolic disturbance could certainly contribute to her symptoms so this follow up will be important.    Problem list:  1. Atypical chest discomfort  2. Dyspnea at rest and with exertion  3. Sinus tachycardia  4. Palpitations  5. Lightheadedness    Plan:  A thorough cardiac evaluation is negative.   Continue to stay well hydrated and follow up with cardiology as needed.  Consider a pulmonology consult.    It was a pleasure to see Ms.  Iain in clinic today with Dr. Winston.    Tai Mendez MD  Cardiovascular Medicine Fellow, PGY-6  Pager: [phone]  Answers for HPI/ROS submitted by the patient on 1/16/2018   General Symptoms: No  Skin Symptoms: No  HENT Symptoms: No  EYE SYMPTOMS: No  HEART SYMPTOMS: Yes  LUNG SYMPTOMS: Yes  INTESTINAL SYMPTOMS: Yes  URINARY SYMPTOMS: No  GYNECOLOGIC SYMPTOMS: No  BREAST SYMPTOMS: No  SKELETAL SYMPTOMS: No  BLOOD SYMPTOMS: No  NERVOUS SYSTEM SYMPTOMS: No  MENTAL HEALTH SYMPTOMS: No  Cough: No  Sputum or phlegm: No  Coughing up blood: No  Difficulty breating or shortness of breath: Yes  Snoring: No  Wheezing: No  Difficulty breathing on exertion: No  Nighttime Cough: No  Difficulty breathing when lying flat: No  Chest pain or pressure: Yes  Fast or irregular heartbeat: Yes  Pain in legs with walking: No  Trouble breathing while lying down: No  Fingers or toes appear blue: No  High blood pressure: No  Low blood pressure: No  Fainting: No  Murmurs: No  Pacemaker: No  Varicose veins: No  Edema or swelling: No  Wake up at night with shortness of breath: Yes  Light-headedness: No  Exercise intolerance: No  Heart burn or indigestion: Yes  Nausea: Yes  Vomiting: No  Abdominal pain: Yes  Bloating: Yes  Constipation: No  Diarrhea: No  Blood in stool: No  Black stools: No  Rectal or Anal pain: No  Fecal incontinence: No  Yellowing of skin or eyes: No  Vomit with blood: No  Change in stools: No      Staff Physician Comments:   I saw and evaluated the patient with the Fellow, and I agree with the assessment and plan.  I was present for the examination.    Magdaleno Winston MD

## 2018-01-23 NOTE — MR AVS SNAPSHOT
"              After Visit Summary   2018    Cayla Timmons    MRN: [id number]           Patient Information     Date Of Birth          1985        Visit Information        Provider Department      2018 7:00 AM Magdaleno Winston MD [facility]        Today's Diagnoses     Atypical chest pain    -  1    SOB (shortness of breath)        Palpitations          Care Instructions    You were seen today in the Cardiovascular Clinic at the [facility].     Cardiology Providers you saw during your visit: Dr. Edy Winston       Follow-up: as needed         Please feel free to call me with any questions or concerns.       Lee Aragon LPN     Questions and [phone].   First press #1 for the [employer] and then press #3 for \"Medical Questions\" to reach us Cardiology Nurses.      On Call Cardiologist for after hours or on weekends: [phone]   option #4 and ask to speak to the on-call Cardiologist.          If you need a medication refill please contact your pharmacy.  Please allow 3 business days for your refill to be completed.            Follow-ups after your visit        Follow-up notes from your care team     Return if symptoms worsen or fail to improve.      Your next 10 appointments already scheduled     2018  3:00 PM CST   FULL PULMONARY FUNCTION with  PFL ZAIRA   [facility] Pulmonary Function Testing ([facility])    [address] [place] [id number] [phone]            2018  4:00 PM CST   (Arrive by 3:45 PM)   New Patient Visit with Osiris Suarez MD   [facility] for Lung Science and Health ([facility])    [address] [place] [id number] [phone]              Who to contact     If you have questions or need follow up information about today's clinic visit or your schedule please contact [facility] directly at " "[phone].  Normal or non-critical lab and imaging results will be communicated to you by MyChart, letter or phone within 4 business days after the clinic has received the results. If you do not hear from us within 7 days, please contact the clinic through Novadiolhart or phone. If you have a critical or abnormal lab result, we will notify you by phone as soon as possible.  Submit refill requests through Epyon or call your pharmacy and they will forward the refill request to us. Please allow 3 business days for your refill to be completed.          Additional Information About Your Visit        Novadiolhart Information     Epyon gives you secure access to your electronic health record. If you see a primary care provider, you can also send messages to your care team and make appointments. If you have questions, please call your primary care clinic.  If you do not have a primary care provider, please call [phone] and they will assist you.        Care EveryWhere ID     This is your Care EveryWhere ID. This could be used by other organizations to access your [facility] medical records  [id number]        Your Vitals Were     Pulse Height Pulse Oximetry BMI (Body Mass Index)          88 1.6 m (5' 3\") 97% 21.79 kg/m2         Blood Pressure from Last 3 Encounters:   01/23/18 95/68   12/21/17 97/56   12/12/17 105/68    Weight from Last 3 Encounters:   01/23/18 55.8 kg (123 lb)   12/21/17 54.3 kg (119 lb 11.2 oz)   12/12/17 54.7 kg (120 lb 9.6 oz)              Today, you had the following     No orders found for display       Primary Care Provider Fax #    Physician No Ref-Primary [phone]       No address on file        Equal Access to Services     DAVID TORRES : Hadii frances Celis, amador padilla, maria guadalupe foster. So [facility] [phone].    ATENCIÓN: Si habla español, tiene a jha disposición servicios gratuitos de asistencia lingüística. Llame al " [phone].    We comply with applicable federal civil rights laws and [place] laws. We do not discriminate on the basis of race, color, national origin, age, disability, sex, sexual orientation, or gender identity.            Thank you!     Thank you for choosing [facility]  for your care. Our goal is always to provide you with excellent care. Hearing back from our patients is one way we can continue to improve our services. Please take a few minutes to complete the written survey that you may receive in the mail after your visit with us. Thank you!             Your Updated Medication List - Protect others around you: Learn how to safely use, store and throw away your medicines at www.disposemymeds.org.          This list is accurate as of 1/23/18 11:59 PM.  Always use your most recent med list.                   Brand Name Dispense Instructions for use Diagnosis    DOCOSAHEXAENOIC ACID PO      as needed        DULERA 100-5 MCG/ACT oral inhaler   Generic drug:  mometasone-formoterol           LORazepam 0.5 MG tablet    ATIVAN     as needed        Mag Glycinate 100 MG Tabs           PRENATAL PO           prochlorperazine 10 MG tablet    COMPAZINE          pyridoxine 100 MG tablet    VITAMIN B-6          sertraline 50 MG tablet    ZOLOFT          UNISOM SLEEPGELS 50 MG Caps   Generic drug:  DiphenhydrAMINE HCl (Sleep)           * VENTOLIN  (90 BASE) MCG/ACT Inhaler   Generic drug:  albuterol           * PROAIR RESPICLICK 108 (90 BASE) MCG/ACT Aepb   Generic drug:  Albuterol Sulfate      INL 2 PFS PO Q 4 H        Vitamin D-3 5000 UNITS Tabs           * Notice:  This list has 2 medication(s) that are the same as other medications prescribed for you. Read the directions carefully, and ask your doctor or other care provider to review them with you.

## 2018-01-23 NOTE — LETTER
1/23/2018      RE: Cayla Timmons  [address] [facility] [address]       Dear Colleague,    Thank you for the opportunity to participate in the care of your patient, Cayla Timmons, at the [facility] HEART [facility]. Please see a copy of my visit note below.    CARDIOLOGY CLINIC    HPI:  Ms. Cayla Timmons returns to clinic today for follow up on her atypical chest discomfort, shortness of breath, palpitations and lightheadedness.     We saw Ms. Timmons in clinic in December for the above symptoms. Multiple tests were ordered which we reviewed during this visit. She reports no change in her symptoms since the last visit. She continues to have shortness of breath and chest tightness at rest that worsens with activity. She only feels better when she is sleeping. No syncope, orthopnea, PND, LE edema. No fasciculations.     PAST MEDICAL HISTORY:  Past Medical History:   Diagnosis Date     Anxiety      Endometriosis      Fibromyalgia      Graves disease        CURRENT MEDICATIONS:  Current Outpatient Prescriptions   Medication Sig Dispense Refill     mometasone-formoterol (DULERA) 100-5 MCG/ACT oral inhaler        Prenatal Vit-Fe Fumarate-FA (PRENATAL PO)        prochlorperazine (COMPAZINE) 10 MG tablet        DiphenhydrAMINE HCl, Sleep, (UNISOM SLEEPGELS) 50 MG CAPS        albuterol (VENTOLIN HFA) 108 (90 BASE) MCG/ACT Inhaler        Cholecalciferol (VITAMIN D-3) 5000 UNITS TABS        Magnesium Bisglycinate (MAG GLYCINATE) 100 MG TABS        pyridoxine (VITAMIN B-6) 100 MG tablet        sertraline (ZOLOFT) 50 MG tablet        Albuterol Sulfate (PROAIR RESPICLICK) 108 (90 BASE) MCG/ACT AEPB INL 2 PFS PO Q 4 H       LORazepam (ATIVAN) 0.5 MG tablet as needed       DOCOSAHEXAENOIC ACID PO as needed         PAST SURGICAL HISTORY:  Past Surgical History:   Procedure Laterality Date     CHOLECYSTECTOMY       CYSTOSCOPY, RETROGRADES, COMBINED  6/11/2013     "Procedure: COMBINED CYSTOSCOPY, RETROGRADES;;  Surgeon: Gerson Maya MD;  Location: [facility] OR     DAVINCI LYSIS OF ADHESIONS  6/11/2013    Procedure: DAVINCI LYSIS OF ADHESIONS;  CYSTOSCOPY, RIGHT RETROGRADE IN ROOM 19 (DR MAYA) DAVINCI PELVISCOPY WITH EXCISION OF ENDOMETRIOSIS (Dr Zavala) ;  Surgeon: Efraín Zavala MD;  Location: [facility] OR     tonsellectomy       wisdom teeth extraction         ALLERGIES  Dairy digestive; Strawberry; Contrast dye; Chicken-derived products (egg); Ragweeds; Gluten meal; and Latex    FAMILY HX:  Family History   Problem Relation Age of Onset     DIABETES Mother      Hypertension Father      Hyperlipidemia Father      Gout Father      Migraines Brother      Obsessive Compulsive Disorder Brother        SOCIAL HX:  Social History     Social History     Marital status: Single     Spouse name: N/A     Number of children: N/A     Years of education: N/A     Social History Main Topics     Smoking status: Never Smoker     Smokeless tobacco: None     Alcohol use No     Drug use: No     Sexual activity: No     Other Topics Concern     None     Social History Narrative     ROS:  Complete 10-point ROS is negative except as noted above    VITAL SIGNS:  BP 95/68  Pulse 88  Ht 1.6 m (5' 3\")  Wt 55.8 kg (123 lb)  SpO2 97%  BMI 21.79 kg/m2  Body mass index is 21.79 kg/(m^2).  Wt Readings from Last 2 Encounters:   01/23/18 55.8 kg (123 lb)   12/21/17 54.3 kg (119 lb 11.2 oz)       PHYSICAL EXAM  Gen: pleasant female sitting comfortably in NAD  Head: nc/at  Eyes: EOMI, PERRL  ENT: no OP lesions or erythema  Neck: supple, no LAD  CV: nml s1/s2, no murmur, no JVD  Chest: clear lungs  Abd: soft, NT, NABS  Ext: warm; no LE edema  Skin: no rash on limited exam  Neuro: alert, oriented; nml speech and gait; no cogwheeling    LABS  Reviewed in care everywhere    ECHO:   12/18/17 Exercise stress echo  Conclusions: Exercise stress echocardiogram test inconclusive for ischemia due  to " inability to achieve target heart rate (78% MPHR).     Patient experienced no chest pain during exercise.  Test terminated due to fatigue.  Exercise capacity is average for age.  Normal HR response to exercise.  Blunted BP response to exercise.  EKG at baseline and with exercise demonstrated no ischemic changes.  Normal LV function at rest. LVEF 55%. No regional wall motion abnormalities.  At peak stress, adequate LV augmentation. LVEF 65%. No regional wall motion  abnormalities.    12/18/17 TTE  Global and regional left ventricular function is normal with an EF of 60-65%.  Right ventricular function, chamber size, wall motion, and thickness are  normal.  Both atria appear normal.  No pericardial effusion is present.    Cardiac Monitor:    12/18/17 7 day ziopatch:  No atrial or ventricular ectopy.  5 triggered events corresponding with sinus rhythm    ASSESSMENT AND PLAN  Ms. Timmons is a very pleasant 31 yo female who presents to clinic for follow up. Her symptoms are unchanged but her cardiac testing is normal. We reviewed all the results together and reassured her that her symptoms are unlikely to be cardiac in nature. Her ziopatch showed tachycardia in the mornings that improved throughout the morning. However she does not report any change in the severity of her symptoms from morning to afternoon so even this observation is unlikely to be related to her symptoms. It may indicate some level of dehydration and we encouraged her to stay well hydrated, especially in the setting of her pregnancy.     Her PFTs were also completed and showed mildly reduced FVC and FEV1 with a normal ratio. This may be effort related or may be due to some pulmonary obstruction. She can consider following up with a pulmonologist.     Cayla has a history of borderline thyroid function tests and is followed closely by an endocrinologist. A metabolic disturbance could certainly contribute to her symptoms so this follow up will be  important.    Problem list:  1. Atypical chest discomfort  2. Dyspnea at rest and with exertion  3. Sinus tachycardia  4. Palpitations  5. Lightheadedness    Plan:  A thorough cardiac evaluation is negative.   Continue to stay well hydrated and follow up with cardiology as needed.  Consider a pulmonology consult.    It was a pleasure to see Ms. Timmons in clinic today with Dr. Winston.    Tai Mendez MD  Cardiovascular Medicine Fellow, PGY-6  Pager: [phone]      Staff Physician Comments:   I saw and evaluated the patient with the Fellow, and I agree with the assessment and plan.  I was present for the examination.    Magdaleno Winston MD

## 2018-02-01 ASSESSMENT — ENCOUNTER SYMPTOMS
RECTAL PAIN: 0
DECREASED CONCENTRATION: 0
COUGH: 0
POOR WOUND HEALING: 0
POSTURAL DYSPNEA: 1
SPUTUM PRODUCTION: 0
SKIN CHANGES: 0
HEMOPTYSIS: 0
NAUSEA: 1
HEARTBURN: 1
SNORES LOUDLY: 0
JAUNDICE: 0
NAIL CHANGES: 1
BOWEL INCONTINENCE: 0
BLOATING: 1
DIARRHEA: 0
BLOOD IN STOOL: 0
WHEEZING: 0
DYSPNEA ON EXERTION: 1
CONSTIPATION: 0
INSOMNIA: 1
SHORTNESS OF BREATH: 1
NERVOUS/ANXIOUS: 1
DEPRESSION: 0
ABDOMINAL PAIN: 1
VOMITING: 0
PANIC: 0
COUGH DISTURBING SLEEP: 0

## 2018-02-05 NOTE — TELEPHONE ENCOUNTER
APPT INFO    Date /Time: 2/8/18, 4pm   Reason for Appt: SOB   Ref Provider/Clinic: [facility]I   Are there internal records? Yes/No?  IF YES, list clinic names: [facility]   Are there outside records? Yes/No? no   Patient Contact (Y/N) & Call Details: No, referred    Action:

## 2018-02-08 ENCOUNTER — PRE VISIT (OUTPATIENT)
Dept: PULMONOLOGY | Facility: CLINIC | Age: 33
End: 2018-02-08

## 2018-02-08 ENCOUNTER — OFFICE VISIT (OUTPATIENT)
Dept: PULMONOLOGY | Facility: CLINIC | Age: 33
End: 2018-02-08
Attending: INTERNAL MEDICINE
Payer: COMMERCIAL

## 2018-02-08 VITALS
HEART RATE: 87 BPM | RESPIRATION RATE: 16 BRPM | SYSTOLIC BLOOD PRESSURE: 113 MMHG | OXYGEN SATURATION: 98 % | DIASTOLIC BLOOD PRESSURE: 73 MMHG | TEMPERATURE: 98.5 F

## 2018-02-08 DIAGNOSIS — R06.02 SOB (SHORTNESS OF BREATH): ICD-10-CM

## 2018-02-08 DIAGNOSIS — R07.89 ATYPICAL CHEST PAIN: Primary | ICD-10-CM

## 2018-02-08 DIAGNOSIS — Z33.1 PREGNANT STATE, INCIDENTAL: ICD-10-CM

## 2018-02-08 PROCEDURE — G0463 HOSPITAL OUTPT CLINIC VISIT: HCPCS | Mod: ZF

## 2018-02-08 ASSESSMENT — PAIN SCALES - GENERAL: PAINLEVEL: NO PAIN (0)

## 2018-02-08 NOTE — LETTER
2/8/2018       RE: Cayla Timmons  [address] [place] [place] [facility] [address]     Dear Colleague,    Thank you for referring your patient, Cayla Timmons, to the [facility] LUNG SCIENCE [facility] at [facility]. Please see a copy of my visit note below.    Pulmonary Clinic New Patient Consult  Reason for Consult: Dyspnea  History of Present Illness  Ms. Timmons is a 32-year-old female with a past medical history of Graves' disease, anxiety, fibromyalgia, endometriosis is referred to pulmonary clinic today for further evaluation of shortness of breath.  Notably, the patient is currently 15 weeks pregnant with a due date of July 30.  Review of outside records suggest an extensive cardiac evaluation over the course of the last 2 months for atypical chest pain, palpitations, and lightheadedness.  Workup was notable for a stress echocardiogram which revealed normal exercise capacity, normal heart rate response, blunted blood pressure response, and left ventricular ejection fraction of 55%.  She also underwent cardiac monitor testing which showed no evidence of ectopy.  There were 5 events but all corresponded with normal sinus rhythm.  She had pulmonary function testing from December 2017 which showed ratio 84%, FVC 76%, FEV1 75%, %, TLC 93%, DLCO 101%.  Study was suggestive of a non-specific ventilatory defect based on normal total lung capacity.  There is no significant response to inhaled bronchodilator therapy.  Per the patient reports she also had a chest x-ray done 1 year ago which was within normal limits.  Unfortunately the report of this chest x-ray is not available for review.    Ms. Timmons endorses shortness of breath which has been occurring over the course of the last year and a half.  She reports that it is not brought on by anything in particular and occurs basically constantly.  She describes the shortness of breath is occurring both at  rest and with exertion.  She states that she is only able to walk for about 10-20 minutes before she would need to stop and rest.  In regards to the chest pain and lightheadedness the symptoms are always present as well.  She denies any cough, sputum production, hemoptysis, significant fluctuations in weight, or recurrent episodes of pneumonia or viral upper respiratory infections.    She does note a history of asthma as a child and in adult life but states that she doubts this diagnosis as she was never provided any symptomatic relief from inhalers.  She also states that she does not think her symptoms are related to anxiety as she has tried multiple antianxiety medications without any significant improvement in either her anxiety or any of these symptoms.  She has a history of seasonal allergies, noting a specific allergy to ragweed but does not take any medications as they do not provide any relief for her.    She is a never smoker. she lives in [place], [place].  She has 2 cats at home either which is never given her any respiratory issues.  She denies any known exposure to asbestos.  She further denies any significant exposure to birds, pillows or comforter stuffed with down feathers, or hot tub or Jacuzzis uses on a regular basis.  Only significant travel outside the area was a trip to [place] in October.    She denies any known family history of lung disease or personal or family history of connective tissue disease.        Review of Systems:  10 of 14 systems reviewed and are negative unless otherwise stated in HPI.    Past Medical History:   Diagnosis Date     Anxiety      Endometriosis      Fibromyalgia      Graves disease        Past Surgical History:   Procedure Laterality Date     CHOLECYSTECTOMY       CYSTOSCOPY, RETROGRADES, COMBINED  6/11/2013    Procedure: COMBINED CYSTOSCOPY, RETROGRADES;;  Surgeon: Gerson Salazar MD;  Location:  OR     [facility] LYSIS OF ADHESIONS  6/11/2013    Procedure:  MARCO LYSIS OF ADHESIONS;  CYSTOSCOPY, RIGHT RETROGRADE IN ROOM 19 (DR MAYA) DAVINCJOCELYNE PELVISCOPY WITH EXCISION OF ENDOMETRIOSIS (Dr Zavala) ;  Surgeon: Efraín Zavala MD;  Location: [facility] OR     tonsellectomy       wisdom teeth extraction         Family History   Problem Relation Age of Onset     DIABETES Mother      Hypertension Father      Hyperlipidemia Father      Gout Father      Migraines Brother      Obsessive Compulsive Disorder Brother        Social History     Social History     Marital status: Single     Spouse name: N/A     Number of children: N/A     Years of education: N/A     Social History Main Topics     Smoking status: Never Smoker     Smokeless tobacco: Never Used     Alcohol use No     Drug use: No     Sexual activity: No     Other Topics Concern     None     Social History Narrative         Allergies   Allergen Reactions     Dairy Digestive Difficulty breathing and Shortness Of Breath     Strawberry Hives     Contrast Dye      Severe nausea     Chicken-Derived Products (Egg)      Ragweeds Fatigue and Itching     Gluten Meal Anxiety, Fatigue and Muscle Pain (Myalgia)     Latex Rash         Current Outpatient Prescriptions:      Fluticasone-Salmeterol (ADVAIR HFA IN), , Disp: , Rfl:      Prenatal Vit-Fe Fumarate-FA (PRENATAL PO), , Disp: , Rfl:      prochlorperazine (COMPAZINE) 10 MG tablet, , Disp: , Rfl:      pyridoxine (VITAMIN B-6) 100 MG tablet, , Disp: , Rfl:      albuterol (VENTOLIN HFA) 108 (90 BASE) MCG/ACT Inhaler, , Disp: , Rfl:      Cholecalciferol (VITAMIN D-3) 5000 UNITS TABS, , Disp: , Rfl:      Magnesium Bisglycinate (MAG GLYCINATE) 100 MG TABS, , Disp: , Rfl:      LORazepam (ATIVAN) 0.5 MG tablet, as needed, Disp: , Rfl:      DOCOSAHEXAENOIC ACID PO, as needed, Disp: , Rfl:      [DISCONTINUED] Albuterol Sulfate (PROAIR RESPICLICK) 108 (90 BASE) MCG/ACT AEPB, INL 2 PFS PO Q 4 H, Disp: , Rfl:       Physical Exam:  /73 (BP Location: Right arm, Patient  Position: Chair, Cuff Size: Adult Regular)  Pulse 87  Temp 98.5  F (36.9  C) (Oral)  Resp 16  SpO2 98%  GENERAL: Well developed, well nourished, alert, and in no apparent distress.  HEENT: Normocephalic, atraumatic. PERRL, EOMI. Oral mucosa is moist. No perioral cyanosis.  NECK: supple, no masses, no thyromegaly.  RESP:  Normal respiratory effort.  CTAB.  No rales, wheezes, rhonchi.  Normal to percussion.  No cyanosis or clubbing.  CV: Normal S1, S2, regular rhythm, normal rate. No murmur.  No LE edema.   ABDOMEN:  Soft, non-tender, non-distended.   SKIN: warm and dry. No rash.  NEURO: AAOx3.  Normal gait.  No focal neuro deficits.  PSYCH: mentation appears normal. and affect normal/bright    Results:  PFTs: Ratio 85%, FVC 75%, FEV1 76%, DLCO 77%.  Study demonstrates a nonspecific ventilatory defect which is neither obstructive nor restrictive based on previously normal TLC.  There is no impairment in gas exchange.  When compared to previous study from December 2017 there has been no significant change in pulmonary function.      Assessment and Plan:   Cayla Timmons is a 32 year old female with a history of anxiety, fibromyalgia, and Graves' disease who presents to pulmonary clinic for further evaluation of shortness of breath.  Based on her history, I cannot easily identify an etiology of her shortness of breath.  Asthma would be a consideration however she denies cough or wheeze, and never experienced improvement in symptoms on inhaler therapy.  As previously mentioned, extensive cardiac work up returned negative, and normal TTE from 12/2017 excludes pulmonary hypertension as well.  While her pulmonary function tests are slightly abnormal, the non-specific ventilatory defect does not point to any particular etiology.  Underlying ILD, perhaps related to connective tissue disease, would be a consideration in a young woman.  However, lack of other symptoms (cough, worse exertional dyspnea), normal TLC, O2  saturation of 98% on RA, and preserved DLCO argue against the likelihood of clinically significant disease. The most definitive evaluation for this would be an HR-CT.  A risk benefit discussion was had with Ms Timmons regarding radiation exposure in pregnancy weighed against the likelihood of obtaining clinically useful information from the study.  As I have a low suspicion for significant parenchymal disease at this time, the decision was made to defer further radiologic evaluation during pregnancy.  We discussed that it would be worthwhile to pursue CT after delivery to provide definitive answer though.  If HR-CT were to be normal, one could consider obtaining a CPET to complete the work up for dyspnea.  Beyond that, there is extremely minimal additional pulmonary testing that I think would be helpful.  I will tentatively plan to see her back in clinic in approximately 8 months with CT chest.  If her OB becomes concerned or she experiences significant worsening of symptoms between now and then I would be happy to revisit this sooner.  She was offered and declined a seasonal influenza vaccine.  Eva Suarez MD  Pulmonary and Critical Care Medicine    The above note was dictated using voice recognition software and may include typographical errors. Please contact the author for any clarifications.    Again, thank you for allowing me to participate in the care of your patient.      Sincerely,    Osiris Suarez MD

## 2018-02-08 NOTE — PROGRESS NOTES
Pulmonary Clinic New Patient Consult  Reason for Consult: Dyspnea  History of Present Illness  Ms. Timmons is a 32-year-old female with a past medical history of Graves' disease, anxiety, fibromyalgia, endometriosis is referred to pulmonary clinic today for further evaluation of shortness of breath.  Notably, the patient is currently 15 weeks pregnant with a due date of July 30.  Review of outside records suggest an extensive cardiac evaluation over the course of the last 2 months for atypical chest pain, palpitations, and lightheadedness.  Workup was notable for a stress echocardiogram which revealed normal exercise capacity, normal heart rate response, blunted blood pressure response, and left ventricular ejection fraction of 55%.  She also underwent cardiac monitor testing which showed no evidence of ectopy.  There were 5 events but all corresponded with normal sinus rhythm.  She had pulmonary function testing from December 2017 which showed ratio 84%, FVC 76%, FEV1 75%, %, TLC 93%, DLCO 101%.  Study was suggestive of a non-specific ventilatory defect based on normal total lung capacity.  There is no significant response to inhaled bronchodilator therapy.  Per the patient reports she also had a chest x-ray done 1 year ago which was within normal limits.  Unfortunately the report of this chest x-ray is not available for review.    Ms. Timmons endorses shortness of breath which has been occurring over the course of the last year and a half.  She reports that it is not brought on by anything in particular and occurs basically constantly.  She describes the shortness of breath is occurring both at rest and with exertion.  She states that she is only able to walk for about 10-20 minutes before she would need to stop and rest.  In regards to the chest pain and lightheadedness the symptoms are always present as well.  She denies any cough, sputum production, hemoptysis, significant fluctuations in weight, or  recurrent episodes of pneumonia or viral upper respiratory infections.    She does note a history of asthma as a child and in adult life but states that she doubts this diagnosis as she was never provided any symptomatic relief from inhalers.  She also states that she does not think her symptoms are related to anxiety as she has tried multiple antianxiety medications without any significant improvement in either her anxiety or any of these symptoms.  She has a history of seasonal allergies, noting a specific allergy to ragweed but does not take any medications as they do not provide any relief for her.    She is a never smoker. she lives in [place], [place].  She has 2 cats at home either which is never given her any respiratory issues.  She denies any known exposure to asbestos.  She further denies any significant exposure to birds, pillows or comforter stuffed with down feathers, or hot tub or Jacuzzis uses on a regular basis.  Only significant travel outside the area was a trip to [place] in October.    She denies any known family history of lung disease or personal or family history of connective tissue disease.        Review of Systems:  10 of 14 systems reviewed and are negative unless otherwise stated in HPI.    Past Medical History:   Diagnosis Date     Anxiety      Endometriosis      Fibromyalgia      Graves disease        Past Surgical History:   Procedure Laterality Date     CHOLECYSTECTOMY       CYSTOSCOPY, RETROGRADES, COMBINED  6/11/2013    Procedure: COMBINED CYSTOSCOPY, RETROGRADES;;  Surgeon: Gerson Maya MD;  Location:  OR     DAVINCI LYSIS OF ADHESIONS  6/11/2013    Procedure: DAVINCI LYSIS OF ADHESIONS;  CYSTOSCOPY, RIGHT RETROGRADE IN ROOM 19 (DR MAYA) DAVINCI PELVISCOPY WITH EXCISION OF ENDOMETRIOSIS (Dr Zavala) ;  Surgeon: Efraín Zavala MD;  Location:  OR     tonsellectomy       wisdom teeth extraction         Family History   Problem Relation Age of Onset      DIABETES Mother      Hypertension Father      Hyperlipidemia Father      Gout Father      Migraines Brother      Obsessive Compulsive Disorder Brother        Social History     Social History     Marital status: Single     Spouse name: N/A     Number of children: N/A     Years of education: N/A     Social History Main Topics     Smoking status: Never Smoker     Smokeless tobacco: Never Used     Alcohol use No     Drug use: No     Sexual activity: No     Other Topics Concern     None     Social History Narrative         Allergies   Allergen Reactions     Dairy Digestive Difficulty breathing and Shortness Of Breath     Strawberry Hives     Contrast Dye      Severe nausea     Chicken-Derived Products (Egg)      Ragweeds Fatigue and Itching     Gluten Meal Anxiety, Fatigue and Muscle Pain (Myalgia)     Latex Rash         Current Outpatient Prescriptions:      Fluticasone-Salmeterol (ADVAIR HFA IN), , Disp: , Rfl:      Prenatal Vit-Fe Fumarate-FA (PRENATAL PO), , Disp: , Rfl:      prochlorperazine (COMPAZINE) 10 MG tablet, , Disp: , Rfl:      pyridoxine (VITAMIN B-6) 100 MG tablet, , Disp: , Rfl:      albuterol (VENTOLIN HFA) 108 (90 BASE) MCG/ACT Inhaler, , Disp: , Rfl:      Cholecalciferol (VITAMIN D-3) 5000 UNITS TABS, , Disp: , Rfl:      Magnesium Bisglycinate (MAG GLYCINATE) 100 MG TABS, , Disp: , Rfl:      LORazepam (ATIVAN) 0.5 MG tablet, as needed, Disp: , Rfl:      DOCOSAHEXAENOIC ACID PO, as needed, Disp: , Rfl:      [DISCONTINUED] Albuterol Sulfate (PROAIR RESPICLICK) 108 (90 BASE) MCG/ACT AEPB, INL 2 PFS PO Q 4 H, Disp: , Rfl:       Physical Exam:  /73 (BP Location: Right arm, Patient Position: Chair, Cuff Size: Adult Regular)  Pulse 87  Temp 98.5  F (36.9  C) (Oral)  Resp 16  SpO2 98%  GENERAL: Well developed, well nourished, alert, and in no apparent distress.  HEENT: Normocephalic, atraumatic. PERRL, EOMI. Oral mucosa is moist. No perioral cyanosis.  NECK: supple, no masses, no  thyromegaly.  RESP:  Normal respiratory effort.  CTAB.  No rales, wheezes, rhonchi.  Normal to percussion.  No cyanosis or clubbing.  CV: Normal S1, S2, regular rhythm, normal rate. No murmur.  No LE edema.   ABDOMEN:  Soft, non-tender, non-distended.   SKIN: warm and dry. No rash.  NEURO: AAOx3.  Normal gait.  No focal neuro deficits.  PSYCH: mentation appears normal. and affect normal/bright    Results:  PFTs: Ratio 85%, FVC 75%, FEV1 76%, DLCO 77%.  Study demonstrates a nonspecific ventilatory defect which is neither obstructive nor restrictive based on previously normal TLC.  There is no impairment in gas exchange.  When compared to previous study from December 2017 there has been no significant change in pulmonary function.      Assessment and Plan:   Cayal Timmons is a 32 year old female with a history of anxiety, fibromyalgia, and Graves' disease who presents to pulmonary clinic for further evaluation of shortness of breath.  Based on her history, I cannot easily identify an etiology of her shortness of breath.  Asthma would be a consideration however she denies cough or wheeze, and never experienced improvement in symptoms on inhaler therapy.  As previously mentioned, extensive cardiac work up returned negative, and normal TTE from 12/2017 excludes pulmonary hypertension as well.  While her pulmonary function tests are slightly abnormal, the non-specific ventilatory defect does not point to any particular etiology.  Underlying ILD, perhaps related to connective tissue disease, would be a consideration in a young woman.  However, lack of other symptoms (cough, worse exertional dyspnea), normal TLC, O2 saturation of 98% on RA, and preserved DLCO argue against the likelihood of clinically significant disease. The most definitive evaluation for this would be an HR-CT.  A risk benefit discussion was had with Ms Timmons regarding radiation exposure in pregnancy weighed against the likelihood of obtaining  clinically useful information from the study.  As I have a low suspicion for significant parenchymal disease at this time, the decision was made to defer further radiologic evaluation during pregnancy.  We discussed that it would be worthwhile to pursue CT after delivery to provide definitive answer though.  If HR-CT were to be normal, one could consider obtaining a CPET to complete the work up for dyspnea.  Beyond that, there is extremely minimal additional pulmonary testing that I think would be helpful.  I will tentatively plan to see her back in clinic in approximately 8 months with CT chest.  If her OB becomes concerned or she experiences significant worsening of symptoms between now and then I would be happy to revisit this sooner.  She was offered and declined a seasonal influenza vaccine.  Eva Suarez MD  Pulmonary and Critical Care Medicine    The above note was dictated using voice recognition software and may include typographical errors. Please contact the author for any clarifications.

## 2018-02-08 NOTE — MR AVS SNAPSHOT
After Visit Summary   2/8/2018    Cayla Timmons    MRN: [id number]           Patient Information     Date Of Birth          1985        Visit Information        Provider Department      2/8/2018 4:00 PM Osiris Suarez MD [facility]        Today's Diagnoses     Atypical chest pain    -  1    SOB (shortness of breath)        Pregnant state, incidental           Follow-ups after your visit        Follow-up notes from your care team     Return in about 8 months (around 10/8/2018).      Who to contact     If you have questions or need follow up information about today's clinic visit or your schedule please contact [facility] directly at [phone].  Normal or non-critical lab and imaging results will be communicated to you by ePatientFinderhart, letter or phone within 4 business days after the clinic has received the results. If you do not hear from us within 7 days, please contact the clinic through ePatientFinderhart or phone. If you have a critical or abnormal lab result, we will notify you by phone as soon as possible.  Submit refill requests through Celtaxsys or call your pharmacy and they will forward the refill request to us. Please allow 3 business days for your refill to be completed.          Additional Information About Your Visit        MyChart Information     C[employer] gives you secure access to your electronic health record. If you see a primary care provider, you can also send messages to your care team and make appointments. If you have questions, please call your primary care clinic.  If you do not have a primary care provider, please call [phone] and they will assist you.        Care EveryWhere ID     This is your Care EveryWhere ID. This could be used by other organizations to access your [place] medical records  [id number]        Your Vitals Were     Pulse Temperature Respirations Pulse Oximetry          87 98.5  F (36.9   C) (Oral) 16 98%         Blood Pressure from Last 3 Encounters:   02/08/18 113/73   01/23/18 95/68   12/21/17 97/56    Weight from Last 3 Encounters:   01/23/18 55.8 kg (123 lb)   12/21/17 54.3 kg (119 lb 11.2 oz)   12/12/17 54.7 kg (120 lb 9.6 oz)              Today, you had the following     No orders found for display       Primary Care Provider Fax #    Physician No Ref-Primary [phone]       No address on file        Equal Access to Services     [facility]: Hadii frances Celis, wanenada javieradaha, qaybodette kaalmadania simon, maria guadalupe simon . So [facility] [phone].    ATENCIÓN: Si habla español, tiene a jha disposición servicios gratuitos de asistencia lingüística. [facility] [phone].    We comply with applicable federal civil rights laws and [place] laws. We do not discriminate on the basis of race, color, national origin, age, disability, sex, sexual orientation, or gender identity.            Thank you!     Thank you for choosing [facility]  for your care. Our goal is always to provide you with excellent care. Hearing back from our patients is one way we can continue to improve our services. Please take a few minutes to complete the written survey that you may receive in the mail after your visit with us. Thank you!             Your Updated Medication List - Protect others around you: Learn how to safely use, store and throw away your medicines at www.disposemymeds.org.          This list is accurate as of 2/8/18  5:12 PM.  Always use your most recent med list.                   Brand Name Dispense Instructions for use Diagnosis    ADVAIR HFA IN           DOCOSAHEXAENOIC ACID PO      as needed        LORazepam 0.5 MG tablet    ATIVAN     as needed        Mag Glycinate 100 MG Tabs           PRENATAL PO           prochlorperazine 10 MG tablet    COMPAZINE          pyridoxine 100 MG tablet    VITAMIN B-6          VENTOLIN  (90  BASE) MCG/ACT Inhaler   Generic drug:  albuterol           Vitamin D-3 5000 UNITS Tabs

## 2018-02-08 NOTE — NURSING NOTE
Chief Complaint   Patient presents with     Consult     Patient is being seen for shortness of breath      Luann Ruiz CMA at 3:43 PM on 2/8/2018

## 2018-02-09 LAB
DLCOUNC-%PRED-PRE: 77 %
DLCOUNC-PRE: 18.98 ML/MIN/MMHG
DLCOUNC-PRED: 24.38 ML/MIN/MMHG
ERV-%PRED-PRE: 50 %
ERV-PRE: 0.66 L
ERV-PRED: 1.29 L
EXPTIME-PRE: 6.29 SEC
FEF2575-%PRED-PRE: 67 %
FEF2575-PRE: 2.34 L/SEC
FEF2575-PRED: 3.45 L/SEC
FEFMAX-%PRED-PRE: 73 %
FEFMAX-PRE: 5.07 L/SEC
FEFMAX-PRED: 6.86 L/SEC
FEV1-%PRED-PRE: 76 %
FEV1-PRE: 2.37 L
FEV1FEV6-PRE: 84 %
FEV1FEV6-PRED: 85 %
FEV1FVC-PRE: 85 %
FEV1FVC-PRED: 85 %
FEV1SVC-PRE: 92 %
FEV1SVC-PRED: 83 %
FIFMAX-PRE: 3.35 L/SEC
FVC-%PRED-PRE: 75 %
FVC-PRE: 2.78 L
FVC-PRED: 3.66 L
IC-%PRED-PRE: 78 %
IC-PRE: 1.91 L
IC-PRED: 2.45 L
VA-%PRED-PRE: 83 %
VA-PRE: 4.1 L
VC-%PRED-PRE: 68 %
VC-PRE: 2.57 L
VC-PRED: 3.74 L

## 2018-03-20 ENCOUNTER — TRANSFERRED RECORDS (OUTPATIENT)
Dept: HEALTH INFORMATION MANAGEMENT | Facility: CLINIC | Age: 33
End: 2018-03-20

## 2018-04-30 ENCOUNTER — TRANSFERRED RECORDS (OUTPATIENT)
Dept: HEALTH INFORMATION MANAGEMENT | Facility: CLINIC | Age: 33
End: 2018-04-30

## 2018-05-17 ENCOUNTER — NURSE TRIAGE (OUTPATIENT)
Dept: NURSING | Facility: CLINIC | Age: 33
End: 2018-05-17

## 2018-05-18 NOTE — TELEPHONE ENCOUNTER
"  Reason for Disposition    Pregnant or postpartum (< 1 month since delivery)    Additional Information    Negative: [1] Breathing stopped AND [2] hasn't returned    Negative: Choking on something    Negative: Severe difficulty breathing (e.g., struggling for each breath, speaks in single words)    Negative: Bluish lips, tongue, or face now    Negative: Difficult to awaken or acting confused  (e.g., disoriented, slurred speech)    Negative: Passed out (i.e., lost consciousness, collapsed and was not responding)    Negative: Wheezing started suddenly after medicine, an allergic food or bee sting    Negative: Stridor    Negative: Slow, shallow and weak breathing    Negative: Sounds like a life-threatening emergency to the triager    Negative: [1] MODERATE difficulty breathing (e.g., speaks in phrases, SOB even at rest, pulse 100-120) AND [2] NEW-onset or WORSE than normal    Negative: Wheezing can be heard across the room    Negative: Drooling or spitting out saliva (because can't swallow)    Negative: History of prior \"blood clot\" in leg or lungs (i.e., deep vein thrombosis, pulmonary embolism)    Negative: History of inherited increased risk of blood clots (e.g., Factor 5 Leiden, Anti-thrombin 3, Protein C or Protein S deficiency, Prothrombin mutation)    Negative: Recent illness requiring prolonged bedrest (i.e., immobilization)    Negative: Hip or leg fracture in past 2 months (e.g., had cast on leg or ankle)    Negative: Major surgery in the past month    Negative: Recent long-distance travel with prolonged time in car, bus, plane, or train (i.e., within past 2 weeks; 6 or  more hours duration)    Negative: Extra heart beats OR irregular heart beating   (i.e., \"palpitations\")    Negative: Fever > 103 F (39.4 C)    Negative: [1] Fever > 101 F (38.3 C) AND [2] age > 60    Negative: [1] Fever > 101 F (38.3 C) AND [2] bedridden (e.g., nursing home patient, CVA, chronic illness, recovering from surgery)    Negative: " [1] Fever > 100.5 F (38.1 C) AND [2] diabetes mellitus or weak immune system (e.g., HIV positive, cancer chemo, splenectomy, organ transplant, chronic steroids)    Negative: [1] Periods where breathing stops and then resumes normally AND [2] bedridden (e.g., nursing home patient, CVA)    Protocols used: BREATHING DIFFICULTY-ADULT-AH

## 2018-05-21 ENCOUNTER — OFFICE VISIT (OUTPATIENT)
Dept: SURGERY | Facility: CLINIC | Age: 33
End: 2018-05-21
Payer: COMMERCIAL

## 2018-05-21 VITALS
SYSTOLIC BLOOD PRESSURE: 103 MMHG | DIASTOLIC BLOOD PRESSURE: 62 MMHG | HEART RATE: 99 BPM | HEIGHT: 63 IN | WEIGHT: 123 LBS | BODY MASS INDEX: 21.79 KG/M2

## 2018-05-21 DIAGNOSIS — R10.13 ABDOMINAL PAIN, EPIGASTRIC: ICD-10-CM

## 2018-05-21 DIAGNOSIS — K44.9 HIATAL HERNIA: Primary | ICD-10-CM

## 2018-05-21 RX ORDER — SUCRALFATE 1 G/1
TABLET ORAL 4 TIMES DAILY
COMMUNITY

## 2018-05-21 RX ORDER — DICYCLOMINE HYDROCHLORIDE 10 MG/1
10 CAPSULE ORAL 4 TIMES DAILY PRN
Qty: 60 CAPSULE | Refills: 0 | Status: SHIPPED | OUTPATIENT
Start: 2018-05-21

## 2018-05-21 NOTE — MR AVS SNAPSHOT
After Visit Summary   5/21/2018    Cayla Timmons    MRN: [id number]           Patient Information     Date Of Birth          1985        Visit Information        Provider Department      5/21/2018 11:30 AM Shalom Mcclure MD [facility]      Today's Diagnoses     Hiatal hernia    -  1       Follow-ups after your visit        Your next 10 appointments already scheduled     May 22, 2018  7:30 PM CDT   MR ABDOMEN W/O & W CONTRAST with [id number]   [facility] ([facility])    [address] [address]   [phone]           Take your medicines as usual, unless your doctor tells you not to. Bring a list of your current medicines to your exam (including vitamins, minerals and over-the-counter drugs). Also bring the results of similar scans you may have had.    You may or may not receive IV contrast for this exam pending the discretion of the Radiologist.   Do not eat or drink for 6 hours prior to exam.  The MRI machine uses a strong magnet. Please wear clothes without metal (snaps, zippers). A sweatsuit works well, or we may give you a hospital gown.  Please remove any body piercings and hair extensions before you arrive. You will also remove watches, jewelry, hairpins, wallets, dentures, partial dental plates and hearing aids. You may wear contact lenses, and you may be able to wear your rings. We have a safe place to keep your personal items, but it is safer to leave them at home.  **IMPORTANT** THE INSTRUCTIONS BELOW ARE ONLY FOR THOSE PATIENTS WHO HAVE BEEN PRESCRIBED SEDATION OR GENERAL ANESTHESIA DURING THEIR MRI PROCEDURE:  IF YOUR DOCTOR PRESCRIBED ORAL SEDATION (take medicine to help you relax during your exam):   You must get the medicine from your doctor (oral medication) before you arrive. Bring the medicine to the exam. Do not take it at home. You ll be told when to take it upon  arriving for your exam.   Arrive one hour early. Bring someone who can take you home after the test. Your medicine will make you sleepy. After the exam, you may not drive, take a bus or take a taxi by yourself.  IF YOUR DOCTOR PRESCRIBED IV SEDATION:   Arrive one hour early. Bring someone who can take you home after the test. Your medicine will make you sleepy. After the exam, you may not drive, take a bus or take a taxi by yourself.   No eating 6 hours before your exam. You may have clear liquids up until 4 hours before your exam. (Clear liquids include water, clear tea, black coffee and fruit juice without pulp.)  IF YOUR DOCTOR PRESCRIBED ANESTHESIA (be asleep for your exam):   Arrive 1 1/2 hours early. Bring someone who can take you home after the test. You may not drive, take a bus or take a taxi by yourself.   No eating 8 hours before your exam. You may have clear liquids up until 4 hours before your exam. (Clear liquids include water, clear tea, black coffee and fruit juice without pulp.)   You will spend four to five hours in the recovery room.  If you have any questions, please contact your Imaging Department exam site.              Future tests that were ordered for you today     Open Future Orders        Priority Expected Expires Ordered    MR Abdomen w/o & w Contrast Routine 5/21/2018 5/21/2019 5/21/2018            Who to contact     If you have questions or need follow up information about today's clinic visit or your schedule please contact [facility] directly at [phone].  Normal or non-critical lab and imaging results will be communicated to you by MyChart, letter or phone within 4 business days after the clinic has received the results. If you do not hear from us within 7 days, please contact the clinic through ExactFlatt or phone. If you have a critical or abnormal lab result, we will notify you by phone as soon as possible.  Submit refill requests through [employer] or call your  "pharmacy and they will forward the refill request to us. Please allow 3 business days for your refill to be completed.          Additional Information About Your Visit        MyChart Information     Mayte[employer] gives you secure access to your electronic health record. If you see a primary care provider, you can also send messages to your care team and make appointments. If you have questions, please call your primary care clinic.  If you do not have a primary care provider, please call [phone] and they will assist you.        Care EveryWhere ID     This is your Care EveryWhere ID. This could be used by other organizations to access your [place] medical records  [id number]        Your Vitals Were     Pulse Height BMI (Body Mass Index)             99 5' 3\" (1.6 m) 21.79 kg/m2          Blood Pressure from Last 3 Encounters:   05/21/18 103/62   02/08/18 113/73   01/23/18 95/68    Weight from Last 3 Encounters:   05/21/18 123 lb (55.8 kg)   01/23/18 123 lb (55.8 kg)   12/21/17 119 lb 11.2 oz (54.3 kg)               Primary Care Provider Office Phone # Fax #    Nicki Leny Marshall [phone]       [facility] [address] [place] [address]        Equal Access to Services     DAVID TORRES AH: Hadii aad ku hadasho Soomaali, waaxda luqadaha, qaybta kaalmada adeegyada, waxay idiin hayaan evens sutton. So [facility] [phone].    ATENCIÓN: Si habla español, tiene a jha disposición servicios gratuitos de asistencia lingüística. Llame al [phone].    We comply with applicable federal civil rights laws and [place] laws. We do not discriminate on the basis of race, color, national origin, age, disability, sex, sexual orientation, or gender identity.            Thank you!     Thank you for choosing [facility]  for your care. Our goal is always to provide you with excellent care. Hearing back from our patients is one way we can continue to improve our " services. Please take a few minutes to complete the written survey that you may receive in the mail after your visit with us. Thank you!             Your Updated Medication List - Protect others around you: Learn how to safely use, store and throw away your medicines at www.disposemymeds.org.          This list is accurate as of 5/21/18 12:51 PM.  Always use your most recent med list.                   Brand Name Dispense Instructions for use Diagnosis    ADVAIR HFA IN           DOCOSAHEXAENOIC ACID PO      as needed        fluticasone 100 MCG/BLIST Aepb    FLOVENT DISKUS     Inhale 1 puff into the lungs every 12 hours        LORazepam 0.5 MG tablet    ATIVAN     as needed        Mag Glycinate 100 MG Tabs           PRENATAL PO           prochlorperazine 10 MG tablet    COMPAZINE          pyridoxine 100 MG tablet    VITAMIN B-6          REMERON PO           sucralfate 1 GM tablet    CARAFATE     Take by mouth 4 times daily        VENTOLIN  (90 Base) MCG/ACT Inhaler   Generic drug:  albuterol           Vitamin D-3 5000 units Tabs

## 2018-05-25 ASSESSMENT — ENCOUNTER SYMPTOMS
ORTHOPNEA: 1
SNORES LOUDLY: 0
DIARRHEA: 0
ABDOMINAL PAIN: 0
PALPITATIONS: 0
COUGH DISTURBING SLEEP: 0
LIGHT-HEADEDNESS: 1
HOARSE VOICE: 0
POSTURAL DYSPNEA: 1
PANIC: 1
TROUBLE SWALLOWING: 0
BOWEL INCONTINENCE: 0
BRUISES/BLEEDS EASILY: 1
CONSTIPATION: 1
DEPRESSION: 0
VOMITING: 0
SWOLLEN GLANDS: 0
BLOOD IN STOOL: 0
NAUSEA: 1
HEMOPTYSIS: 0
SPUTUM PRODUCTION: 0
HYPOTENSION: 0
HYPERTENSION: 0
SYNCOPE: 0
SINUS PAIN: 0
RECTAL PAIN: 0
BLOATING: 1
COUGH: 1
SMELL DISTURBANCE: 0
INSOMNIA: 1
JAUNDICE: 0
LEG PAIN: 0
DECREASED CONCENTRATION: 1
TASTE DISTURBANCE: 0
DYSPNEA ON EXERTION: 1
NERVOUS/ANXIOUS: 1
SHORTNESS OF BREATH: 1
NECK MASS: 0
SLEEP DISTURBANCES DUE TO BREATHING: 1
EXERCISE INTOLERANCE: 0
SINUS CONGESTION: 1
HEARTBURN: 0
SORE THROAT: 0
WHEEZING: 0

## 2018-05-30 ENCOUNTER — OFFICE VISIT (OUTPATIENT)
Dept: SURGERY | Facility: CLINIC | Age: 33
End: 2018-05-30
Attending: THORACIC SURGERY (CARDIOTHORACIC VASCULAR SURGERY)
Payer: COMMERCIAL

## 2018-05-30 VITALS
BODY MASS INDEX: 23.92 KG/M2 | SYSTOLIC BLOOD PRESSURE: 115 MMHG | HEART RATE: 112 BPM | HEIGHT: 63 IN | DIASTOLIC BLOOD PRESSURE: 49 MMHG | RESPIRATION RATE: 16 BRPM | TEMPERATURE: 97.5 F | WEIGHT: 135 LBS | OXYGEN SATURATION: 97 %

## 2018-05-30 DIAGNOSIS — K44.9 HIATAL HERNIA: ICD-10-CM

## 2018-05-30 PROCEDURE — G0463 HOSPITAL OUTPT CLINIC VISIT: HCPCS | Mod: ZF

## 2018-05-30 RX ORDER — AMOXICILLIN 250 MG
CAPSULE ORAL
COMMUNITY
Start: 2018-04-30

## 2018-05-30 RX ORDER — FLUTICASONE PROPIONATE 50 MCG
2 SPRAY, SUSPENSION (ML) NASAL
COMMUNITY

## 2018-05-30 RX ORDER — CETIRIZINE HYDROCHLORIDE 10 MG/1
10 TABLET ORAL
COMMUNITY

## 2018-05-30 RX ORDER — BUSPIRONE HYDROCHLORIDE 7.5 MG/1
7.5 TABLET ORAL
COMMUNITY
Start: 2018-05-24 | End: 2019-05-24

## 2018-05-30 RX ORDER — MONTELUKAST SODIUM 10 MG/1
10 TABLET ORAL
COMMUNITY
Start: 2018-05-09 | End: 2019-05-09

## 2018-05-30 RX ORDER — CALCIUM CARBONATE/VITAMIN D3 500-10/5ML
400 LIQUID (ML) ORAL
COMMUNITY
Start: 2018-05-24 | End: 2018-05-31

## 2018-05-30 RX ORDER — HYDROXYZINE HYDROCHLORIDE 25 MG/1
25 TABLET, FILM COATED ORAL
COMMUNITY
Start: 2018-05-24

## 2018-05-30 ASSESSMENT — PAIN SCALES - GENERAL: PAINLEVEL: NO PAIN (0)

## 2018-05-30 NOTE — PROGRESS NOTES
"THORACIC SURGERY - NEW PATIENT OFFICE VISIT    Dear Dr. Mcclure,    I saw Ms. Timmons at request in consultation for the evaluation and treatment of a hiatal hernia.     HPI  Cayla Timmons is a 32 year old year-old female who is pregnant with due date of July 30th. She has had epigastric fullness and a sensation of \"heat across the chest\" that worsens later in the day. She does not describe heartburn in the usual terms, regurgitation or dysphagia.  She complains of worsening early satiety and lack of appetite that seem more related to her pregnancy. Additionally, she has been having exertional shortness of breath that predates her pregnancy, and saw a pulmonologist while pregnant. DLCO is normal  A CT scan of the abdomen from 2013 does not show a hernia, and the recent CT of the chest (4/2018): shows a small hiatal hernia (taken while in her 3rd trimester already).    Previsit Tests   PFT 2/8/2018: FEV1 76%, DLCO 77%  PMH  Past Medical History:   Diagnosis Date     Anxiety      Endometriosis      Fibromyalgia      Graves disease       PSH  Past Surgical History:   Procedure Laterality Date     CHOLECYSTECTOMY       CYSTOSCOPY, RETROGRADES, COMBINED  6/11/2013    Procedure: COMBINED CYSTOSCOPY, RETROGRADES;;  Surgeon: Gerson Maya MD;  Location:  OR     DAVINCI LYSIS OF ADHESIONS  6/11/2013    Procedure: DAVINCI LYSIS OF ADHESIONS;  CYSTOSCOPY, RIGHT RETROGRADE IN ROOM 19 (DR MAYA) DAVINCI PELVISCOPY WITH EXCISION OF ENDOMETRIOSIS (Dr Zavala) ;  Surgeon: Efraín Zavala MD;  Location:  OR     tonsellectomy       wisdom teeth extraction          ETOH: negative  TOB: negative    Physical examination  BMI ---  She is a bit dyspneic while talking.    From a personal perspective, she is here alone, this is her first pregnancy.    IMPRESSION (K44.9) Hiatal hernia     32 year old year-old female with a hiatal hernia.    PLAN  I spent a total of 30 minutes with Ms. Timmons, more than 50% " of which were spent in counseling, coordination of care, and face-to-face time. I reviewed the plan as follows:  I had a long conversation with her regarding her atypical symptoms. I explained that after she delivers and when she is ready, she can call us and we will set her up for an EGD and Bravo and see her back in clinic afterwards.  All questions were answered and Cayla Timmons and present family were in agreement with the plan.  I appreciate the opportunity to participate in the care of your patient and will keep you updated.  Sincerely,

## 2018-05-30 NOTE — LETTER
"5/30/2018      RE: Cayla Timmons  [address] [facility] [address]       THORACIC SURGERY - NEW PATIENT OFFICE VISIT    Dear Dr. Mcclure,    I saw Ms. Timmons at request in consultation for the evaluation and treatment of a hiatal hernia.     HPI  Cayla Timmons is a 32 year old year-old female who is pregnant with due date of July 30th. She has had epigastric fullness and a sensation of \"heat across the chest\" that worsens later in the day. She does not describe heartburn in the usual terms, regurgitation or dysphagia.  She complains of worsening early satiety and lack of appetite that seem more related to her pregnancy. Additionally, she has been having exertional shortness of breath that predates her pregnancy, and saw a pulmonologist while pregnant. DLCO is normal  A CT scan of the abdomen from 2013 does not show a hernia, and the recent CT of the chest (4/2018): shows a small hiatal hernia (taken while in her 3rd trimester already).    Previsit Tests   PFT 2/8/2018: FEV1 76%, DLCO 77%  PMH  Past Medical History:   Diagnosis Date     Anxiety      Endometriosis      Fibromyalgia      Graves disease       PSH  Past Surgical History:   Procedure Laterality Date     CHOLECYSTECTOMY       CYSTOSCOPY, RETROGRADES, COMBINED  6/11/2013    Procedure: COMBINED CYSTOSCOPY, RETROGRADES;;  Surgeon: Gerson Maya MD;  Location:  OR     DAVINCI LYSIS OF ADHESIONS  6/11/2013    Procedure: DAVINCI LYSIS OF ADHESIONS;  CYSTOSCOPY, RIGHT RETROGRADE IN ROOM 19 (DR MAYA) DAVINCI PELVISCOPY WITH EXCISION OF ENDOMETRIOSIS (Dr Zavala) ;  Surgeon: Efraín Zavala MD;  Location:  OR     tonsellectomy       wisdom teeth extraction          ETOH: negative  TOB: negative    Physical examination  BMI ---  She is a bit dyspneic while talking.    From a personal perspective, she is here alone, this is her first pregnancy.    IMPRESSION (K44.9) Hiatal hernia     32 year old year-old female with " a hiatal hernia.    PLAN  I spent a total of 30 minutes with Ms. Timmons, more than 50% of which were spent in counseling, coordination of care, and face-to-face time. I reviewed the plan as follows:  I had a long conversation with her regarding her atypical symptoms. I explained that after she delivers and when she is ready, she can call us and we will set her up for an EGD and Bravo and see her back in clinic afterwards.  All questions were answered and Cayla Timmons and present family were in agreement with the plan.  I appreciate the opportunity to participate in the care of your patient and will keep you updated.  Sincerely,      Bryon Eubanks MD

## 2018-05-30 NOTE — NURSING NOTE
"Oncology Rooming Note    May 30, 2018 2:01 PM   Cayla Timmons is a 32 year old female who presents for:    Chief Complaint   Patient presents with     Oncology Clinic Visit     New Patient; Hiatal hernia     Initial Vitals: /49  Pulse 112  Temp 97.5  F (36.4  C) (Oral)  Resp 16  Ht 1.6 m (5' 3\")  Wt 61.2 kg (135 lb)  SpO2 97%  Breastfeeding? No  BMI 23.91 kg/m2 Estimated body mass index is 23.91 kg/(m^2) as calculated from the following:    Height as of this encounter: 1.6 m (5' 3\").    Weight as of this encounter: 61.2 kg (135 lb). Body surface area is 1.65 meters squared.  No Pain (0) Comment: Data Unavailable   No LMP recorded. Patient is pregnant.  Allergies reviewed: Yes  Medications reviewed: Yes    Medications: Medication refills not needed today.  Pharmacy name entered into iFit: [facility] [address] [place] [address] [facility] AT [facility]    Clinical concerns: New patient Hiatal Hernia     6 minutes for nursing intake (face to face time)     Kaycee Gore CMA              "

## 2018-05-30 NOTE — MR AVS SNAPSHOT
"              After Visit Summary   5/30/2018    Cayla Timmons    MRN: [id number]           Patient Information     Date Of Birth          1985        Visit Information        Provider Department      5/30/2018 2:00 PM Bryon Eubanks MD [facility]        Today's Diagnoses     Hiatal hernia           Follow-ups after your visit        Who to contact     If you have questions or need follow up information about today's clinic visit or your schedule please contact [facility] directly at [phone].  Normal or non-critical lab and imaging results will be communicated to you by Jalousierhart, letter or phone within 4 business days after the clinic has received the results. If you do not hear from us within 7 days, please contact the clinic through 5 examplest or phone. If you have a critical or abnormal lab result, we will notify you by phone as soon as possible.  Submit refill requests through [employer] or call your pharmacy and they will forward the refill request to us. Please allow 3 business days for your refill to be completed.          Additional Information About Your Visit        MyChart Information     [employer] gives you secure access to your electronic health record. If you see a primary care provider, you can also send messages to your care team and make appointments. If you have questions, please call your primary care clinic.  If you do not have a primary care provider, please call [phone] and they will assist you.        Care EveryWhere ID     This is your Care EveryWhere ID. This could be used by other organizations to access your [place] medical records  [id number]        Your Vitals Were     Pulse Temperature Respirations Height Pulse Oximetry Breastfeeding?    112 97.5  F (36.4  C) (Oral) 16 1.6 m (5' 3\") 97% No    BMI (Body Mass Index)                   23.91 kg/m2            Blood Pressure from Last 3 Encounters:   05/30/18 115/49   05/21/18 " 103/62   02/08/18 113/73    Weight from Last 3 Encounters:   05/30/18 61.2 kg (135 lb)   05/21/18 55.8 kg (123 lb)   01/23/18 55.8 kg (123 lb)              Today, you had the following     No orders found for display       Primary Care Provider Office Phone # Fax #    Nicki Leny Marshall [phone]       [facility] [address] [facility] [address]        Equal Access to Services     [facility] : Hadii aad ku hadasho Soomaali, waaxda luqadaha, qaybta kaalmada adeegyada, waxcristi beckett hayharrison simon . So [facility] [phone].    ATENCIÓN: Si habla español, tiene a jha disposición servicios gratuitos de asistencia lingüística. Llame al [phone].    We comply with applicable federal civil rights laws and [place] laws. We do not discriminate on the basis of race, color, national origin, age, disability, sex, sexual orientation, or gender identity.            Thank you!     Thank you for choosing [facility]  for your care. Our goal is always to provide you with excellent care. Hearing back from our patients is one way we can continue to improve our services. Please take a few minutes to complete the written survey that you may receive in the mail after your visit with us. Thank you!             Your Updated Medication List - Protect others around you: Learn how to safely use, store and throw away your medicines at www.disposemymeds.org.          This list is accurate as of 5/30/18  5:28 PM.  Always use your most recent med list.                   Brand Name Dispense Instructions for use Diagnosis    ADVAIR HFA IN           busPIRone 7.5 MG tablet    BUSPAR     Take 7.5 mg by mouth        cetirizine 10 MG tablet    zyrTEC     Take 10 mg by mouth        dicyclomine 10 MG capsule    BENTYL    60 capsule    Take 1 capsule (10 mg) by mouth 4 times daily as needed    Abdominal pain, epigastric       DOCOSAHEXAENOIC ACID PO      as needed         fluticasone 100 MCG/BLIST Aepb    FLOVENT DISKUS     Inhale 1 puff into the lungs every 12 hours        fluticasone 50 MCG/ACT spray    FLONASE     2 sprays        hydrOXYzine 25 MG tablet    ATARAX     Take 25 mg by mouth        Iron (Ferrous Gluconate) 256 (28 Fe) MG Tabs      10.5 mg        LORazepam 0.5 MG tablet    ATIVAN     as needed        Mag Glycinate 100 MG Tabs           magnesium oxide 400 MG Caps      Take 400 mg by mouth        montelukast 10 MG tablet    SINGULAIR     Take 10 mg by mouth        PRENATAL PO           prochlorperazine 10 MG tablet    COMPAZINE          pyridoxine 100 MG tablet    VITAMIN B-6          ranitidine 150 MG tablet    ZANTAC     Take 150 mg by mouth        REMERON PO           senna-docusate 8.6-50 MG per tablet    SENOKOT-S;PERICOLACE          sucralfate 1 GM tablet    CARAFATE     Take by mouth 4 times daily        VENTOLIN  (90 Base) MCG/ACT Inhaler   Generic drug:  albuterol           Vitamin D-3 5000 units Tabs

## 2019-09-28 ENCOUNTER — HEALTH MAINTENANCE LETTER (OUTPATIENT)
Age: 34
End: 2019-09-28

## 2020-02-19 ENCOUNTER — APPOINTMENT (OUTPATIENT)
Age: 35
Setting detail: DERMATOLOGY
End: 2020-02-19

## 2020-02-19 ENCOUNTER — RX ONLY (RX ONLY)
Age: 35
End: 2020-02-19

## 2020-02-19 DIAGNOSIS — L85.3 XEROSIS CUTIS: ICD-10-CM

## 2020-02-19 DIAGNOSIS — L20.9 ATOPIC DERMATITIS, UNSPECIFIED: ICD-10-CM

## 2020-02-19 PROCEDURE — OTHER COUNSELING: OTHER

## 2020-02-19 PROCEDURE — 99203 OFFICE O/P NEW LOW 30 MIN: CPT

## 2020-02-19 ASSESSMENT — LOCATION ZONE DERM: LOCATION ZONE: HAND

## 2020-02-19 ASSESSMENT — LOCATION DETAILED DESCRIPTION DERM
LOCATION DETAILED: RIGHT ULNAR PALM
LOCATION DETAILED: RIGHT THENAR EMINENCE
LOCATION DETAILED: LEFT RADIAL PALM
LOCATION DETAILED: LEFT ULNAR PALM

## 2020-02-19 ASSESSMENT — LOCATION SIMPLE DESCRIPTION DERM
LOCATION SIMPLE: LEFT HAND
LOCATION SIMPLE: RIGHT HAND

## 2021-01-10 ENCOUNTER — HEALTH MAINTENANCE LETTER (OUTPATIENT)
Age: 36
End: 2021-01-10

## 2021-10-23 ENCOUNTER — HEALTH MAINTENANCE LETTER (OUTPATIENT)
Age: 36
End: 2021-10-23

## 2022-02-12 ENCOUNTER — HEALTH MAINTENANCE LETTER (OUTPATIENT)
Age: 37
End: 2022-02-12

## 2022-10-09 ENCOUNTER — HEALTH MAINTENANCE LETTER (OUTPATIENT)
Age: 37
End: 2022-10-09

## 2023-02-18 ENCOUNTER — HEALTH MAINTENANCE LETTER (OUTPATIENT)
Age: 38
End: 2023-02-18

## 2024-03-16 ENCOUNTER — HEALTH MAINTENANCE LETTER (OUTPATIENT)
Age: 39
End: 2024-03-16

## 2024-07-02 ENCOUNTER — LAB REQUISITION (OUTPATIENT)
Dept: LAB | Facility: CLINIC | Age: 39
End: 2024-07-02

## 2024-07-02 DIAGNOSIS — Z13.1 ENCOUNTER FOR SCREENING FOR DIABETES MELLITUS: ICD-10-CM

## 2024-07-02 DIAGNOSIS — E55.9 VITAMIN D DEFICIENCY, UNSPECIFIED: ICD-10-CM

## 2024-07-02 DIAGNOSIS — Z13.220 ENCOUNTER FOR SCREENING FOR LIPOID DISORDERS: ICD-10-CM

## 2024-07-02 LAB — HBA1C MFR BLD: 5.7 %

## 2024-07-02 PROCEDURE — 83036 HEMOGLOBIN GLYCOSYLATED A1C: CPT | Performed by: OBSTETRICS & GYNECOLOGY

## 2024-07-02 PROCEDURE — 82306 VITAMIN D 25 HYDROXY: CPT | Performed by: OBSTETRICS & GYNECOLOGY

## 2024-07-02 PROCEDURE — 80061 LIPID PANEL: CPT | Performed by: OBSTETRICS & GYNECOLOGY

## 2024-07-03 LAB
CHOLEST SERPL-MCNC: 187 MG/DL
FASTING STATUS PATIENT QL REPORTED: NO
HDLC SERPL-MCNC: 80 MG/DL
LDLC SERPL CALC-MCNC: 75 MG/DL
NONHDLC SERPL-MCNC: 107 MG/DL
TRIGL SERPL-MCNC: 158 MG/DL
VIT D+METAB SERPL-MCNC: 43 NG/ML (ref 20–50)